# Patient Record
Sex: FEMALE | Race: WHITE | NOT HISPANIC OR LATINO | Employment: FULL TIME
[De-identification: names, ages, dates, MRNs, and addresses within clinical notes are randomized per-mention and may not be internally consistent; named-entity substitution may affect disease eponyms.]

---

## 2017-07-01 ENCOUNTER — HEALTH MAINTENANCE LETTER (OUTPATIENT)
Age: 47
End: 2017-07-01

## 2018-03-07 LAB — MAMMOGRAM: NORMAL

## 2018-07-20 ENCOUNTER — RECORDS - HEALTHEAST (OUTPATIENT)
Dept: LAB | Facility: CLINIC | Age: 48
End: 2018-07-20

## 2018-07-20 LAB
CHOLEST SERPL-MCNC: 209 MG/DL
FASTING STATUS PATIENT QL REPORTED: ABNORMAL
HDLC SERPL-MCNC: 43 MG/DL
LDLC SERPL CALC-MCNC: 132 MG/DL
TRIGL SERPL-MCNC: 168 MG/DL

## 2018-07-23 LAB
TTG IGA SER-ACNC: 0.1 U/ML
TTG IGG SER-ACNC: <0.6 U/ML

## 2019-08-20 ENCOUNTER — TELEPHONE (OUTPATIENT)
Dept: OTOLARYNGOLOGY | Facility: CLINIC | Age: 49
End: 2019-08-20

## 2019-08-20 NOTE — TELEPHONE ENCOUNTER
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 Yes    By : OMI Gabriel last    Date: 1/6/2011    Are there any new or worsening symptoms? Yes: the papilloma larynxhas come back and she is hoping to see Dr. Gabriel sooner than 11/12(There was an appointment on 8/27 but she will be moving he son into the [employer] dorms that day) anything other than that day she could do, please advise       Action Taken: Message routed to:  [facility] ([facility] ENT

## 2019-08-20 NOTE — TELEPHONE ENCOUNTER
LVM with patient letting her know that I was able to get her rescheduled to a sooner date of 9/24.  Left call center number in case that ends up not working for her.

## 2019-08-26 NOTE — TELEPHONE ENCOUNTER
FUTURE VISIT INFORMATION      FUTURE VISIT INFORMATION:    Date: 9.24.19    Time: 11:45 AM    Location: [facility]  REFERRAL INFORMATION:    Referring provider:  Self-referred *Pt previously saw Dr. Gabriel back in 2011    Referring providers clinic:  N/A    Reason for visit/diagnosis  Papilloma Larynx    RECORDS REQUESTED FROM:       Clinic name Comments Records Status Imaging Status   [facility] ENT 1.6.11 (most recent) Dr. Gabriel, office visit [facility] 12.17.10  9.16.05 [facility]    Radiology 12.17.10 (most recent) CT Sinus [facility] PACS                       Action    Action Taken Called pt and LVM on cell phone regarding any external medical records related to diagnoses.     Action 9.11.19 11:40 AM   Action Taken 2nd time leaving  regarding location of medical records. Left CB#.   -9.11.19 1:59 PM Spoke with PtRe, she has only been seen here. No outside records related to this issue.

## 2019-09-24 ENCOUNTER — TELEPHONE (OUTPATIENT)
Dept: OTOLARYNGOLOGY | Facility: CLINIC | Age: 49
End: 2019-09-24

## 2019-09-24 ENCOUNTER — OFFICE VISIT (OUTPATIENT)
Dept: OTOLARYNGOLOGY | Facility: CLINIC | Age: 49
End: 2019-09-24
Payer: COMMERCIAL

## 2019-09-24 ENCOUNTER — PRE VISIT (OUTPATIENT)
Dept: OTOLARYNGOLOGY | Facility: CLINIC | Age: 49
End: 2019-09-24

## 2019-09-24 VITALS
WEIGHT: 205 LBS | DIASTOLIC BLOOD PRESSURE: 91 MMHG | HEART RATE: 80 BPM | SYSTOLIC BLOOD PRESSURE: 134 MMHG | BODY MASS INDEX: 35.19 KG/M2

## 2019-09-24 DIAGNOSIS — D14.0 SCHNEIDERIAN PAPILLOMA: ICD-10-CM

## 2019-09-24 DIAGNOSIS — D36.9 PAPILLOMA: Primary | ICD-10-CM

## 2019-09-24 ASSESSMENT — PAIN SCALES - GENERAL: PAINLEVEL: NO PAIN (0)

## 2019-09-24 NOTE — NURSING NOTE
Chief Complaint   Patient presents with     Consult     Papilloma larynx recheck     BP (!) 134/91 (BP Location: Left arm, Patient Position: Chair, Cuff Size: Adult Regular)   Pulse 80   Wt 93 kg (205 lb)   BMI 35.19 kg/m        Aria Turner, EMT

## 2019-09-24 NOTE — LETTER
9/24/2019       RE: Chio Bledsoe  [address] [facility] [address] [address]     Dear Colleague,    Thank you for referring your patient, Chio Bledsoe, to the  rumr: turn off the lights [facility]. Please see a copy of my visit note below.    Erroneous encounter.  No skilled speech services were warranted.    Again, thank you for allowing me to participate in the care of your patient.      Sincerely,    Elisabet Jc, SLP

## 2019-09-24 NOTE — PATIENT INSTRUCTIONS
Thank you for choosing  Phys[facility].  Dr. Gabriel recommended you to follow up with Dr. Chapin.  Will contact you pertaining to biopsy results.     [phone] appointment scheduling option 1 and nurse advice option 3.

## 2019-09-24 NOTE — LETTER
9/24/2019       RE: Chio Bledsoe  [address] [address]     Dear Colleague,    Thank you for referring your patient, Chio Bledsoe, to the [facility] EAR NOSE AND THROAT at [facility]. Please see a copy of my visit note below.      HISTORY OF PRESENT ILLNESS: Chio Bledsoe is a 48 year old female with a history of right maxillary schneiderian papilloma.She had an endoscopic ethmoidectomy and maxillary antrostomy with   removal of a right anterior choanal polyp.  This turned out to be a   schneiderian papilloma.  This was performed on October 1, 2003. On 9/16/2005 she had a right endoscopic medial maxillectomy and a left maxillary antrostomy.  Papilloma filled the right maxillary sinus ostium and appeared to originate from the anterior wall of the maxillary sinus.  The entire inferior turbinate and medial wall the maxillary sinus were removed.  The middle turbinate was preserved.  She had an endoscopic maxillary antrostomy with removal of contents with image guidance for inverting papilloma on December 17, 2010.   She did quite well following the procedure.  There was a moderate amount of bleeding during the surgery but no postoperative bleeding.  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  I last saw her on January 6, 2011.  At that time there was some crusting in the nasal cavity but the ostium was widely patent.  Our plan was to have her follow-up every 6months.  She comes now approximately 8-1/2 years later.    She continues to be able to breathe through her nose but she has noticed increased snoring.  She notes a slight weight gain over the last 8 to 9 years but otherwise feels well.  She is here to check for her schneiderian papilloma to see if this could be related to her snoring.         Medical History      Medical History Date Comments   Chickenpox        Inverted papilloma of maxillary sinus   papilloma of right maxillary sinus   Anxiety           PAST SURGICAL HISTORY:   Past Surgical History:   Procedure Laterality Date     SURGICAL HISTORY OF -       sinus surgery X 3     SURGICAL HISTORY OF -       wisdom teeth extraction     SURGICAL HISTORY OF -   1/10    breaset reduction     SURGICAL HISTORY OF -   12-18-10    Right endoscopic maxillary antrostomy with removal of contents with image guidance       FAMILY HISTORY:   Family History   Problem Relation Age of Onset     C.A.D. Mother      Hypertension Mother      Lipids Mother      Diabetes No family hx of      Cerebrovascular Disease No family hx of      Breast Cancer No family hx of      Cancer - colorectal No family hx of        SOCIAL HISTORY:   Social History     Tobacco Use     Smoking status: Never Smoker     Smokeless tobacco: Never Used   Substance Use Topics     Alcohol use: Yes     Comment: occ       REVIEW OF SYSTEMS: Ten point review of systems was performed and is negative except for:   [facility] 9/20/2019   Constitutional Problems with sleep   Gastrointestinal/Genitourinary Heartburn/indigestion        ALLERGIES: Patient has no known allergies.    MEDICATIONS:   Current Outpatient Medications   Medication Sig Dispense Refill     SEASONIQUE 0.15-0.03 &0.01 MG OR TABS 1 TABLET DAILY           PHYSICAL EXAMINATION:  She  is awake, alert and in no apparent distress.    Her tympanic membranes are clear and intact bilaterally. External auditory canals are clear.  Nasal exam shows a mild septal deviation without obstruction.  Examination of the oral cavity shows no suspicious lesions.  There is symmetric movement of the tongue and soft palate.    The oropharynx is clear.  Her neck is supple without significant adenopathy.  Pulse is regular.  Upper airway is clear.  Cranial nerves II-XII are grossly intact.       PROCEDURE: A nasal endoscopy was performed.  Informed consent was obtained and a time out  was performed. 3% lidocaine and 0.25% phenylephrine was sprayed into the nasal cavity and allowed 3 to 5 minutes for effect. The scope was passed through the right sided nostril.  Examination showed papillomatous material in the floor the nasal cavity emanating from the lower half of the right maxillary sinus ostium.  This extended down the medial wall of the maxillary sinus.   The lateral and posterior wall of the maxillary sinus ostia is clear.  The papillomatous material extended slightly posteriorly but not into the nasopharynx.  The remainder the nasal cavity was clear.    After a thorough discussion we did decide to perform a biopsy of this material although it appeared very suspicious for schneiderian papilloma.  After obtaining informed consent additional 3% lidocaine and quarter percent phenylephrine sprayed in the right nasal cavity.  Under endoscopic visualization the starting papilloma was biopsied several times.  This was sent for permanent pathology.        IMPRESSION/PLAN: Recurrent  inverting papilloma.  My recommendation to her is that she sees my partner Dr. Chapin for evaluation and subsequent treatment of this lesion.  I advised her that he specializes in this disorder and would be able to give her longer term follow-up than I could.      Again, thank you for allowing me to participate in the care of your patient.      Sincerely,    Danny Gabriel MD

## 2019-09-24 NOTE — PROGRESS NOTES
HISTORY OF PRESENT ILLNESS: Chio Bledsoe is a 48 year old female with a history of right maxillary schneiderian papilloma.She had an endoscopic ethmoidectomy and maxillary antrostomy with   removal of a right anterior choanal polyp.  This turned out to be a   schneiderian papilloma.  This was performed on October 1, 2003. On 9/16/2005 she had a right endoscopic medial maxillectomy and a left maxillary antrostomy.  Papilloma filled the right maxillary sinus ostium and appeared to originate from the anterior wall of the maxillary sinus.  The entire inferior turbinate and medial wall the maxillary sinus were removed.  The middle turbinate was preserved.  She had an endoscopic maxillary antrostomy with removal of contents with image guidance for inverting papilloma on December 17, 2010.   She did quite well following the procedure.  There was a moderate amount of bleeding during the surgery but no postoperative bleeding.  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  I last saw her on January 6, 2011.  At that time there was some crusting in the nasal cavity but the ostium was widely patent.  Our plan was to have her follow-up every 6months.  She comes now approximately 8-1/2 years later.    She continues to be able to breathe through her nose but she has noticed increased snoring.  She notes a slight weight gain over the last 8 to 9 years but otherwise feels well.  She is here to check for her schneiderian papilloma to see if this could be related to her snoring.         Medical History      Medical History Date Comments   Chickenpox       Inverted papilloma of maxillary sinus   papilloma of right maxillary sinus   Anxiety           PAST SURGICAL HISTORY:   Past Surgical History:   Procedure Laterality Date     SURGICAL HISTORY OF -       sinus surgery X 3     SURGICAL HISTORY OF -       wisdom teeth extraction     SURGICAL HISTORY  OF -   1/10    breaset reduction     SURGICAL HISTORY OF -   12-18-10    Right endoscopic maxillary antrostomy with removal of contents with image guidance       FAMILY HISTORY:   Family History   Problem Relation Age of Onset     C.A.D. Mother      Hypertension Mother      Lipids Mother      Diabetes No family hx of      Cerebrovascular Disease No family hx of      Breast Cancer No family hx of      Cancer - colorectal No family hx of        SOCIAL HISTORY:   Social History     Tobacco Use     Smoking status: Never Smoker     Smokeless tobacco: Never Used   Substance Use Topics     Alcohol use: Yes     Comment: occ       REVIEW OF SYSTEMS: Ten point review of systems was performed and is negative except for:   [facility] ROS 9/20/2019   Constitutional Problems with sleep   Gastrointestinal/Genitourinary Heartburn/indigestion        ALLERGIES: Patient has no known allergies.    MEDICATIONS:   Current Outpatient Medications   Medication Sig Dispense Refill     SEASONIQUE 0.15-0.03 &0.01 MG OR TABS 1 TABLET DAILY           PHYSICAL EXAMINATION:  She  is awake, alert and in no apparent distress.    Her tympanic membranes are clear and intact bilaterally. External auditory canals are clear.  Nasal exam shows a mild septal deviation without obstruction.  Examination of the oral cavity shows no suspicious lesions.  There is symmetric movement of the tongue and soft palate.    The oropharynx is clear.  Her neck is supple without significant adenopathy.  Pulse is regular.  Upper airway is clear.  Cranial nerves II-XII are grossly intact.       PROCEDURE: A nasal endoscopy was performed.  Informed consent was obtained and a time out was performed. 3% lidocaine and 0.25% phenylephrine was sprayed into the nasal cavity and allowed 3 to 5 minutes for effect. The scope was passed through the right sided nostril.  Examination showed papillomatous material in the floor the nasal cavity emanating from the lower half of the right  maxillary sinus ostium.  This extended down the medial wall of the maxillary sinus.   The lateral and posterior wall of the maxillary sinus ostia is clear.  The papillomatous material extended slightly posteriorly but not into the nasopharynx.  The remainder the nasal cavity was clear.    After a thorough discussion we did decide to perform a biopsy of this material although it appeared very suspicious for schneiderian papilloma.  After obtaining informed consent additional 3% lidocaine and quarter percent phenylephrine sprayed in the right nasal cavity.  Under endoscopic visualization the starting papilloma was biopsied several times.  This was sent for permanent pathology.        IMPRESSION/PLAN: Recurrent  inverting papilloma.  My recommendation to her is that she sees my partner Dr. Chapin for evaluation and subsequent treatment of this lesion.  I advised her that he specializes in this disorder and would be able to give her longer term follow-up than I could.

## 2019-09-25 PROBLEM — D14.0: Status: ACTIVE | Noted: 2019-09-25

## 2019-09-26 LAB — COPATH REPORT: NORMAL

## 2019-10-03 ENCOUNTER — HEALTH MAINTENANCE LETTER (OUTPATIENT)
Age: 49
End: 2019-10-03

## 2019-10-23 ENCOUNTER — DOCUMENTATION ONLY (OUTPATIENT)
Dept: CARE COORDINATION | Facility: CLINIC | Age: 49
End: 2019-10-23

## 2019-11-01 ENCOUNTER — HEALTH MAINTENANCE LETTER (OUTPATIENT)
Age: 49
End: 2019-11-01

## 2019-12-03 DIAGNOSIS — D36.9 PAPILLOMA: Primary | ICD-10-CM

## 2019-12-04 ENCOUNTER — ANCILLARY PROCEDURE (OUTPATIENT)
Dept: CT IMAGING | Facility: CLINIC | Age: 49
End: 2019-12-04
Attending: OTOLARYNGOLOGY
Payer: COMMERCIAL

## 2019-12-04 ENCOUNTER — OFFICE VISIT (OUTPATIENT)
Dept: OTOLARYNGOLOGY | Facility: CLINIC | Age: 49
End: 2019-12-04
Payer: COMMERCIAL

## 2019-12-04 VITALS — WEIGHT: 205 LBS | HEIGHT: 65 IN | BODY MASS INDEX: 34.16 KG/M2

## 2019-12-04 DIAGNOSIS — D14.0 INVERTED PAPILLOMA OF LATERAL NASAL WALL: ICD-10-CM

## 2019-12-04 DIAGNOSIS — D36.9 PAPILLOMA: ICD-10-CM

## 2019-12-04 DIAGNOSIS — J32.0 CHRONIC MAXILLARY SINUSITIS: ICD-10-CM

## 2019-12-04 DIAGNOSIS — G47.30 SLEEP-DISORDERED BREATHING: ICD-10-CM

## 2019-12-04 DIAGNOSIS — R09.81 NASAL CONGESTION: ICD-10-CM

## 2019-12-04 DIAGNOSIS — R06.83 SNORING: Primary | ICD-10-CM

## 2019-12-04 DIAGNOSIS — D14.0 SCHNEIDERIAN PAPILLOMA: ICD-10-CM

## 2019-12-04 ASSESSMENT — PAIN SCALES - GENERAL: PAINLEVEL: NO PAIN (0)

## 2019-12-04 ASSESSMENT — MIFFLIN-ST. JEOR: SCORE: 1550.75

## 2019-12-04 NOTE — PROGRESS NOTES
[facility]                   New Patient Visit      Encounter date: December 4, 2019    Referring Provider:   Dr. Danny Gabriel MD    Chief Complaint: inverted papilloma; snoring    History of Present Illness: Chio Bledsoe is a 49 year old woman who is referred to me by Dr. Gabriel for inverted papilloma. She has undergone multiple ESS for recurrent IP of the right maxillary by Dr. aGbriel, most recently she underwent ESS with medial maxillectomy in 2010. She was lost to follow up for several years and returned to Dr. Gabriel's office with complaints of poor sleep, snoring, and she was curious about recurrence of IP. She was seen by Dr. Gabriel, who identified a lesion of the right nasal cavity and performed biopsy which indicated recurrence of IP ( no malignancy identified on biopsy). She denies epistaxis or significant nasal congestion, facial pain or pressure, facial hypesthesia. As noted, has noted poor sleep associated with snoring at night (unclear if apneic events present). She also states she feels that poor sleep quality may be perimenopausal.       Review of systems: A 14-point review of systems has been conducted and was negative for any notable symptoms, except as dictated in the history of present illness.     Past Medical History:   Diagnosis Date     Polyp of nasal cavity         Past Surgical History:   Procedure Laterality Date     SURGICAL HISTORY OF -       sinus surgery X 3     SURGICAL HISTORY OF -       wisdom teeth extraction     SURGICAL HISTORY OF -   1/10    breaset reduction     SURGICAL HISTORY OF -   12-18-10    Right endoscopic maxillary antrostomy with removal of contents with image guidance        Family History   Problem Relation Age of Onset     C.A.D. Mother      Hypertension Mother      Lipids Mother      Diabetes No family hx of      Cerebrovascular Disease No family hx of      Breast Cancer No family hx of      Cancer - colorectal No family hx  "of         Social History     Socioeconomic History     Marital status:      Spouse name: None     Number of children: None     Years of education: None     Highest education level: None   Occupational History     None   Social Needs     Financial resource strain: None     Food insecurity:     Worry: None     Inability: None     Transportation needs:     Medical: None     Non-medical: None   Tobacco Use     Smoking status: Never Smoker     Smokeless tobacco: Never Used   Substance and Sexual Activity     Alcohol use: Yes     Comment: occ     Drug use: No     Sexual activity: None   Lifestyle     Physical activity:     Days per week: None     Minutes per session: None     Stress: None   Relationships     Social connections:     Talks on phone: None     Gets together: None     Attends [employer] service: None     Active member of club or organization: None     Attends meetings of clubs or organizations: None     Relationship status: None     Intimate partner violence:     Fear of current or ex partner: None     Emotionally abused: None     Physically abused: None     Forced sexual activity: None   Other Topics Concern     None   Social History Narrative     None        Physical Exam:  Vital signs: Ht 1.651 m (5' 5\")   Wt 93 kg (205 lb)   BMI 34.11 kg/m     General Appearance: No acute distress, appropriate demeanor, conversant  Eyes: moist conjunctivae; EOMI; pupils symmetric; visual acuity grossly intact; no proptosis  Head: normocephalic; overall symmetric appearance without deformity  Face: overall symmetric without deformity; HB I-VI  Ears: Normal appearance of external ear; external meatus normal in appearance; TMs intact without perforation bilaterally;   Nose: No external deformity; septum relatively midline; shaggy polypoid mass evident in right nasal cavity   Oral Cavity/oropharynx: Normal appearance of mucosa; tongue midline; no mass or lesions; tonsils 1+; oropharynx without obvious mucosal " abnormality; Mallampati class 3 oral/oropharyngeal airway  Neck: no palpable lymphadenopathy; thyroid without palpable nodules  Lungs: symmetric chest rise; no wheezing  CV: Good distal perfusion; normal hear rate  Extremities: No deformity  Neurologic Exam: Cranial nerves II-XII are grossly intact; no focal deficit      Procedure Note  Procedure performed: Rigid nasal endoscopy  Indication: To evaluate for sinonasal pathology not visualized on routine anterior rhinoscopy  Anesthesia: 4% topical lidocaine with 0.05% oxymetazoline  Description of procedure: A 30 degree, 3 mm rigid endoscope was inserted into bilateral nasal cavities and the nasal valves, nasal cavity, middle meatus, sphenoethmoid recess, and nasopharynx were thoroughly evaluated for evidence of obstruction, edema, purulence, polyps and/or mass/lesion.     Findings  RT: shaggy polypoid lesion of right nasal cavity, lateral nasal wall; difficult to determine of max sinus is auto-obliterated with mucosal thickening or tumor present in max sinus  LT: nasal cavity clear; MM clear    The patient tolerated the procedure well without complication.     Laboratory Review:  n/a    Imaging Review:  Reviewed updated CT sinus obtained prior to visit on 12/4/2019: RT max sinus/nasal cavity mass c/w known inverted papilloma; there is yareli-osteogenesis of RT max sinus    Pathology Review:  9/24/2019  SPECIMEN(S):   Papiloma     FINAL DIAGNOSIS:   SINUS MASS, SITE NOT SPECIFIED, EXCISIONAL BIOPSY:   - Sinonasal (Schneiderian) papilloma, inverted type, inflamed.   - No evidence of dysplasia or malignancy identified.     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 Mrs. Bledsoe's primary motivation for seeking treatment today is related to belief that recurrent IP may be contributing to poor sleep quality/snoring. I related to the patient that her poor sleep quality and snoring  is multifactorial and may be perimenopausal. Since she has had multiple surgeries for IP, she is reluctant to proceed with surgery immediately, but is curious as to whether other airway surgery may be done concordantly to address snoring while also addressing her recurrent IP. I discussed with Chio that prior to any sleep surgery being performed, a sleep study would need to be performed. I also discussed that while the right nasal cavity is obstructed by recurrent IP, I don't think that this, by itself, is soley responsible for her poor sleep quality and snoring. We discussed merits of nasal surgery and the potential effect on snoring.  I also discussed risks of revision ESS for IP removal, including but not limited to, orbital injury, facial hypesthesia, dental/tooth root injury, need for Carrero-Jean-Paul approach to reach lateral limits of tumor (lateral to infraorbital nerve), epistaxis, recurrence, change in final pathology, among others. I also discussed risks of sampling error on most recent biopsy and that there is still chance that tumor may harbor dysplastic portions/malignancy. She expressed understanding.       Plan:  1. Will order formal sleep study  2. Will have patient follow up with me after sleep study, potentially on same day as clinic with Dr. White. Can potentially discuss merits of sleep surgery. I would not recommend tonsillectomy be performed concurrently with IP surgery.     Naeem Chapin MD    [facility]  Rhinology  Endoscopic Skull Base Surgery  [facility]  Department of Otolaryngology - Head & Neck Surgery

## 2019-12-04 NOTE — LETTER
12/4/2019        RE: Chio Bledsoe  [address] [facility] [id number]     Dear Colleague,    Thank you for referring your patient, Chio Bledsoe, to the [facility] EAR NOSE AND THROAT at [facility]. Please see a copy of my visit note below.                       [facility]                   New Patient Visit      Encounter date: December 4, 2019    Referring Provider:   Dr. Danny Gabriel MD    Chief Complaint: inverted papilloma; snoring    History of Present Illness: Chio Bledsoe is a 49 year old woman who is referred to me by Dr. Gabriel for inverted papilloma. She has undergone multiple ESS for recurrent IP of the right maxillary by Dr. Gabriel, most recently she underwent ESS with medial maxillectomy in 2010. She was lost to follow up for several years and returned to Dr. Gabriel's office with complaints of poor sleep, snoring, and she was curious about recurrence of IP. She was seen by Dr. Gabriel, who identified a lesion of the right nasal cavity and performed biopsy which indicated recurrence of IP ( no malignancy identified on biopsy). She denies epistaxis or significant nasal congestion, facial pain or pressure, facial hypesthesia. As noted, has noted poor sleep associated with snoring at night (unclear if apneic events present). She also states she feels that poor sleep quality may be perimenopausal.       Review of systems: A 14-point review of systems has been conducted and was negative for any notable symptoms, except as dictated in the history of present illness.     Past Medical History:   Diagnosis Date     Polyp of nasal cavity         Past Surgical History:   Procedure Laterality Date     SURGICAL HISTORY OF -       sinus surgery X 3     SURGICAL HISTORY OF -       wisdom teeth extraction     SURGICAL HISTORY OF -   1/10    breaset reduction     SURGICAL HISTORY OF -   12-18-10    Right endoscopic maxillary antrostomy with  "removal of contents with image guidance        Family History   Problem Relation Age of Onset     C.A.D. Mother      Hypertension Mother      Lipids Mother      Diabetes No family hx of      Cerebrovascular Disease No family hx of      Breast Cancer No family hx of      Cancer - colorectal No family hx of         Social History     Socioeconomic History     Marital status:      Spouse name: None     Number of children: None     Years of education: None     Highest education level: None   Occupational History     None   Social Needs     Financial resource strain: None     Food insecurity:     Worry: None     Inability: None     Transportation needs:     Medical: None     Non-medical: None   Tobacco Use     Smoking status: Never Smoker     Smokeless tobacco: Never Used   Substance and Sexual Activity     Alcohol use: Yes     Comment: occ     Drug use: No     Sexual activity: None   Lifestyle     Physical activity:     Days per week: None     Minutes per session: None     Stress: None   Relationships     Social connections:     Talks on phone: None     Gets together: None     Attends Uats[employer] service: None     Active member of club or organization: None     Attends meetings of clubs or organizations: None     Relationship status: None     Intimate partner violence:     Fear of current or ex partner: None     Emotionally abused: None     Physically abused: None     Forced sexual activity: None   Other Topics Concern     None   Social History Narrative     None        Physical Exam:  Vital signs: Ht 1.651 m (5' 5\")   Wt 93 kg (205 lb)   BMI 34.11 kg/m      General Appearance: No acute distress, appropriate demeanor, conversant  Eyes: moist conjunctivae; EOMI; pupils symmetric; visual acuity grossly intact; no proptosis  Head: normocephalic; overall symmetric appearance without deformity  Face: overall symmetric without deformity; HB I-VI  Ears: Normal appearance of external ear; external meatus normal in " appearance; TMs intact without perforation bilaterally;   Nose: No external deformity; septum relatively midline; shaggy polypoid mass evident in right nasal cavity   Oral Cavity/oropharynx: Normal appearance of mucosa; tongue midline; no mass or lesions; tonsils 1+; oropharynx without obvious mucosal abnormality; Mallampati class 3 oral/oropharyngeal airway  Neck: no palpable lymphadenopathy; thyroid without palpable nodules  Lungs: symmetric chest rise; no wheezing  CV: Good distal perfusion; normal hear rate  Extremities: No deformity  Neurologic Exam: Cranial nerves II-XII are grossly intact; no focal deficit      Procedure Note  Procedure performed: Rigid nasal endoscopy  Indication: To evaluate for sinonasal pathology not visualized on routine anterior rhinoscopy  Anesthesia: 4% topical lidocaine with 0.05% oxymetazoline  Description of procedure: A 30 degree, 3 mm rigid endoscope was inserted into bilateral nasal cavities and the nasal valves, nasal cavity, middle meatus, sphenoethmoid recess, and nasopharynx were thoroughly evaluated for evidence of obstruction, edema, purulence, polyps and/or mass/lesion.     Findings  RT: shaggy polypoid lesion of right nasal cavity, lateral nasal wall; difficult to determine of max sinus is auto-obliterated with mucosal thickening or tumor present in max sinus  LT: nasal cavity clear; MM clear    The patient tolerated the procedure well without complication.     Laboratory Review:  n/a    Imaging Review:  Reviewed updated CT sinus obtained prior to visit on 12/4/2019: RT max sinus/nasal cavity mass c/w known inverted papilloma; there is yareli-osteogenesis of RT max sinus    Pathology Review:  9/24/2019  SPECIMEN(S):   Papiloma     FINAL DIAGNOSIS:   SINUS MASS, SITE NOT SPECIFIED, EXCISIONAL BIOPSY:   - Sinonasal (Schneiderian) papilloma, inverted type, inflamed.   - No evidence of dysplasia or malignancy identified.     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 Mrs. Bledsoe's primary motivation for seeking treatment today is related to belief that recurrent IP may be contributing to poor sleep quality/snoring. I related to the patient that her poor sleep quality and snoring is multifactorial and may be perimenopausal. Since she has had multiple surgeries for IP, she is reluctant to proceed with surgery immediately, but is curious as to whether other airway surgery may be done concordantly to address snoring while also addressing her recurrent IP. I discussed with Chio that prior to any sleep surgery being performed, a sleep study would need to be performed. I also discussed that while the right nasal cavity is obstructed by recurrent IP, I don't think that this, by itself, is soley responsible for her poor sleep quality and snoring. We discussed merits of nasal surgery and the potential effect on snoring.  I also discussed risks of revision ESS for IP removal, including but not limited to, orbital injury, facial hypesthesia, dental/tooth root injury, need for Carrero-Jean-Paul approach to reach lateral limits of tumor (lateral to infraorbital nerve), epistaxis, recurrence, change in final pathology, among others. I also discussed risks of sampling error on most recent biopsy and that there is still chance that tumor may harbor dysplastic portions/malignancy. She expressed understanding.       Plan:  1. Will order formal sleep study  2. Will have patient follow up with me after sleep study, potentially on same day as clinic with Dr. White. Can potentially discuss merits of sleep surgery. I would not recommend tonsillectomy be performed concurrently with IP surgery.     Naeem Chapin MD    [facility]  Rhinology  Endoscopic Skull Base Surgery  [facility]  Department of Otolaryngology - Head & Neck Surgery

## 2019-12-04 NOTE — NURSING NOTE
"Chief Complaint   Patient presents with     RECHECK     Papilloma, snoring     Height 1.651 m (5' 5\"), weight 93 kg (205 lb), not currently breastfeeding.    Aria Turner, EMT  "

## 2019-12-04 NOTE — PATIENT INSTRUCTIONS
You were seen in the ENT clinic today with Dr. Chapin    Recommendations for you:    -Sleep Study      We would like you to follow up one week after your sleep study to see Dr. White and Dr. Chapin.       Please call our clinic for any questions, concerns, and/or worsening symptoms.      Clinic #[phone]       Option 1 for scheduling.    Thank you for allowing us to be apart of your care!    Tamika CORONEL RNCC    If you need to reach me my direct line is: [phone]

## 2020-06-23 ENCOUNTER — TRANSFERRED RECORDS (OUTPATIENT)
Dept: MULTI SPECIALTY CLINIC | Facility: CLINIC | Age: 50
End: 2020-06-23
Payer: COMMERCIAL

## 2020-06-23 LAB
HPV ABSTRACT: NORMAL
PAP-ABSTRACT: NORMAL

## 2020-10-22 VITALS — HEIGHT: 65 IN | WEIGHT: 195 LBS | BODY MASS INDEX: 32.49 KG/M2

## 2020-10-22 RX ORDER — LEVONORGESTREL / ETHINYL ESTRADIOL AND ETHINYL ESTRADIOL 150-30(84)
1 KIT ORAL
COMMUNITY
Start: 2020-03-17 | End: 2021-12-01

## 2020-10-22 RX ORDER — TRAZODONE HYDROCHLORIDE 50 MG/1
TABLET, FILM COATED ORAL
COMMUNITY
End: 2021-09-02

## 2020-10-22 ASSESSMENT — MIFFLIN-ST. JEOR: SCORE: 1500.39

## 2020-10-22 NOTE — PROGRESS NOTES
Sleep Consultation:    Date on this visit: 10/23/2020    Chio Bledsoe is sent by Naeem Chapin MD for a sleep consultation regarding snoring.    Primary Physician: Chio Rodriguez     Chio Bledsoe reports snoring and poor sleep quality for 4 years. 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    She has also been referred to Chio White to discuss surgical options for possible apnea.    Re goes to sleep at 11:30 PM to midnight during the week. She wakes up at 8:00 AM (sometimes 7 AM if she has to bring one of her kids to school) with an alarm. She feels her deepest sleep is between 6-7 AM. She falls asleep in 60 minutes.  Chio has difficulty falling asleep and staying asleep. She takes trazodone 12.5-25 mg to help with waking in the night about weekly. She wakes up 1-2 times a night for  minutes before falling back to sleep. She feels the trazodone helps her sleep better for a few days. Chio wakes up feeling hot or to uncertain reasons. She may get up for the restroom, but does not think that is why she wakes.  If she is tossing and turning a lot, she may go to the couch or another bed.  On weekends, her sleep schedule is about 30 minutes later.  Patient gets an average of 4-6 hours of sleep per night without trazodone, 7 hours with trazodone. Her FitBit says she wakes 12-15 times per night.    Patient does worry in bed and does not use electronics in bed, watch TV in bed and read in bed.     Chio does not work currently. She does not have much to do in the day other than visiting her mother who is in a memory care facility. She lives with her  and 2 kids (15 and 20-at college).      Chio does snore some nights and snoring is loud. She is only aware of snoring over the last 3-4 years. Patient does have a regular bed partner. They occasionally sleep separately but not due to her  snoring. Her  only occasionally comments on her snoring. He is a heavy sleeper, so she thinks it occurs more often than he comments. Whenever her family travels together, her kids comment on her snoring. She wakes herself with a snort and jerk in the first 1-2 hours of the night. She is often supine when that happens. Her family has observed that when they travel together. There is no report of witnessed apneas.  Patient sleeps on her side and stomach. She has occasional morning headaches (at the back of her head), denies morning dry mouth, morning confusion and restless legs. Chio has occasional hypnogogic/hypnopompic hallucinations or confusional arousals (sometimes wakes and thinks there is someone in the closet, sometimes she wakes and she has gone into the closet, this has not happened lately) and denies any bruxism, sleep walking, sleep talking, sleep paralysis and cataplexy.    Chio has difficulty breathing through her nose, reflux at night, heartburn and anxiety, denies claustrophobia and depression.  She has difficulty breathing through her nose due to the papilloma.  She was on escitalopram for anxiety about 4 years ago. She was on it for 2 years. She has a wedge pillow for reflux at night. She tries to reduce food after 7 PM and has reduced specific triggering foods.    Chio has gained 10-15 pounds in the last 5-6 years.  Patient describes themself as a night person.  She would prefer to go to sleep at 1:00 AM and wake up at 9:00-10:00 AM.  Patient's [place] Sleepiness score 16/24 consistent with moderate daytime sleepiness.      Chio naps 1-2 times per week and feels unrefreshed after naps. She takes some inadvertant naps reading. If she took trazodone the night before, she sleeps 2 hours, otherwise, she sleeps 15 minutes.  She denies dozing while driving.  Patient was counseled on the importance of driving while alert, to pull over if drowsy, or nap before getting into the vehicle  if sleepy.  She uses 1-2 cups/day of coffee. Last caffeine intake is usually before noon.    Allergies:    Allergies   Allergen Reactions     [place] Nite Time Anxiety       Medications:    Current Outpatient Medications   Medication Sig Dispense Refill     levonorgest-eth estrad 91-Day (SEASONIQUE) 0.15-0.03 &0.01 MG tablet Take 1 tablet by mouth       SEASONIQUE 0.15-0.03 &0.01 MG OR TABS 1 TABLET DAILY       traZODone (DESYREL) 50 MG tablet 1/2 TAB(S) ONCE AT NIGHT ORALLY 90 DAYS         Problem List:  Patient Active Problem List    Diagnosis Date Noted     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     Priority: Medium     CARDIOVASCULAR SCREENING; LDL GOAL LESS THAN 160 10/31/2010     Priority: Medium     SINUS POLYP 09/13/2005     Priority: Medium        Past Medical/Surgical History:  Past Medical History:   Diagnosis Date     Polyp of nasal cavity      Past Surgical History:   Procedure Laterality Date     SURGICAL HISTORY OF -       sinus surgery X 3     SURGICAL HISTORY OF -       wisdom teeth extraction     SURGICAL HISTORY OF -   1/10    breaset reduction     SURGICAL HISTORY OF -   12-18-10    Right endoscopic maxillary antrostomy with removal of contents with image guidance       Social History:  Social History     Socioeconomic History     Marital status:      Spouse name: Not on file     Number of children: Not on file     Years of education: Not on file     Highest education level: Not on file   Occupational History     Not on file   Social Needs     Financial resource strain: Not on file     Food insecurity     Worry: Not on file     Inability: Not on file     Transportation needs     Medical: Not on file     Non-medical: Not on file   Tobacco Use     Smoking status: Never Smoker     Smokeless tobacco: Never Used   Substance and Sexual Activity     Alcohol use: Yes     Comment: occ     Drug use: No     Sexual activity: Not on file   Lifestyle     Physical activity     Days per week: Not on file      Minutes per session: Not on file     Stress: Not on file   Relationships     Social connections     Talks on phone: Not on file     Gets together: Not on file     Attends Restorationism service: Not on file     Active member of club or organization: Not on file     Attends meetings of clubs or organizations: Not on file     Relationship status: Not on file     Intimate partner violence     Fear of current or ex partner: Not on file     Emotionally abused: Not on file     Physically abused: Not on file     Forced sexual activity: Not on file   Other Topics Concern     Not on file   Social History Narrative     Not on file       Family History:  Family History   Problem Relation Age of Onset     C.A.D. Mother      Hypertension Mother      Lipids Mother      Diabetes No family hx of      Cerebrovascular Disease No family hx of      Breast Cancer No family hx of      Cancer - colorectal No family hx of        Review of Systems:  A complete review of systems reviewed by me is negative with the exeption of what has been mentioned in the history of present illness.  CONSTITUTIONAL: NEGATIVE for weight gain/loss, fever, chills, sweats or night sweats, drug allergies.  EYES: NEGATIVE for changes in vision, blind spots, double vision.  ENT: NEGATIVE for ear pain, sore throat, sinus pain, post-nasal drip, runny nose, bloody nose  CARDIAC: NEGATIVE for fast heartbeats or fluttering in chest, chest pain or pressure, breathlessness when lying flat, swollen legs or swollen feet.  NEUROLOGIC: NEGATIVE headaches, weakness or numbness in the arms or legs.  DERMATOLOGIC: NEGATIVE for rashes, new moles or change in mole(s)  PULMONARY: NEGATIVE SOB at rest, SOB with activity, dry cough, productive cough, coughing up blood, wheezing or whistling when breathing.    GASTROINTESTINAL: NEGATIVE for nausea or vomitting, loose or watery stools, fat or grease in stools, constipation, abdominal pain, bowel movements black in color or blood  "noted.  GENITOURINARY: NEGATIVE for pain during urination, blood in urine, urinating more frequently than usual, irregular menstrual periods.  MUSCULOSKELETAL: NEGATIVE for muscle pain, bone or joint pain, swollen joints.  ENDOCRINE: NEGATIVE for increased thirst or urination, diabetes.  LYMPHATIC: NEGATIVE for swollen lymph nodes, lumps or bumps in the breasts or nipple discharge.    Physical Examination:  Vitals: Ht 1.651 m (5' 5\")   Wt 88.5 kg (195 lb)   BMI 32.45 kg/m           [place] Total Score 10/22/2020   Total score - [place] 16       RHIANNON Total Score: 19 (10/22/20 1400)      Impression/Plan:    (R06.83) Snoring  (primary encounter diagnosis), (R40.0) Sleepiness  Comment: Re presents with concerns of snoring and poor sleep quality in the last 4 years. She is not sure how much of her symptoms are a consequence of a papilloma crowding her maxillary sinus, menopause, or weight gain. She does not know exactly how much she snores because her  is a sound sleeper. She has woken herself with a snort and body jerk, primarily if supine. This has been observed by her sons during travels. She is not told that she seems to have pauses in her breathing. She has daytime sleepiness, ESS 16/24. She has gained 10-15 pounds in the last 5-6 years. Her age is 50. Negative risk factors include; no HTN, BMI <35 (32), female gender. We do not have a neck circumference. She will return to her ENT provider and Chio White to discuss surgical options and to see if a procedure for apnea could be performed at the same time as the papilloma is addressed.  Plan: Comprehensive Sleep Study        Full night PSG. Bring trazodone to help with sleep if needed. We discussed that surgical options are generally a last resort and about 50% efficacy. I would defer to an opinion from Dr. White though.     (F51.04) Chronic insomnia  Comment: She is in bed 11:30 PM or midnight to 8 AM (sometimes 7 AM if she needs to bring her daughter to " school). It takes her an hour to fall asleep and she wakes 1-2 times per night for  minutes. She does worry in bed. She also has not been working and is a lot more sedentary around the house now. 12.5-25 mg trazodone has been very helpful, but she does not want to be dependent on it.   Plan: We will discuss management options after her sleep study.      Literature provided regarding sleep apnea.      She will follow up with me in approximately two weeks after her sleep study has been competed to review the results and discuss plan of care.       Polysomnography reviewed.  Obstructive sleep apnea reviewed.  Complications of untreated sleep apnea were reviewed.  69 minutes (9:04 AM-10:13 AM) was spent during this visit, over 50% in counseling and coordination of care.   Bennett Goltz, PA-C     CC: Naeem Chapin MD

## 2020-10-22 NOTE — PATIENT INSTRUCTIONS
"MY TREATMENT INFORMATION FOR SLEEP APNEA-  Chio Bledsoe    DOCTOR : Bennett Ezra Goltz, PA-C  SLEEP CENTER : [place]     MY CONTACT NUMBER: [phone]    Am I having a sleep study at a sleep center?  Make sure you have an appointment for the study before you leave!    Am I having a home sleep study?  Watch this video:  /drop off device-   Https://www.Nistica.com/watch?v=yGGFBdELGhk  Disposable device sent out require phone/computer application-   https://www.Nistica.com/watch?v=BCce_vbiwxE  Please verify your insurance coverage with your insurance carrier    Frequently asked questions:  1. What is Obstructive Sleep Apnea (FABIOLA)? FABIOLA is the most common type of sleep apnea. Apnea means, \"without breath.\"  Apnea is most often caused by narrowing or collapse of the upper airway as muscles relax during sleep.   Almost everyone has occasional apneas. Most people with sleep apnea have had brief interruptions at night frequently for many years.  The severity of sleep apnea is related to how frequent and severe the events are.   2. What are the consequences of FABIOLA? Symptoms include: feeling sleepy during the day, snoring loudly, gasping or stopping of breathing, trouble sleeping, and occasionally morning headaches or heartburn at night.  Sleepiness can be serious and even increase the risk of falling asleep while driving. Other health consequences may include development of high blood pressure and other cardiovascular disease in persons who are susceptible. Untreated FABIOLA  can contribute to heart disease, stroke and diabetes.   3. What are the treatment options? In most situations, sleep apnea is a lifelong disease that must be managed with daily therapy. Medications are not effective for sleep apnea and surgery is generally not considered until other therapies have been tried. Your treatment is your choice . Continuous Positive Airway (CPAP) works right away and is the therapy that is effective in nearly " everyone. An oral device to hold your jaw forward is usually the next most reliable option. Other options include postioning devices (to keep you off your back), weight loss, and surgery including a tongue pacing device. There is more detail about some of these options below.    Important tips for using CPAP and similar devices   Know your equipment:  CPAP is continuous positive airway pressure that prevents obstructive sleep apnea by keeping the throat from collapsing while you are sleeping. In most cases, the device is  smart  and can slowly self-adjusts if your throat collapses and keeps a record every day of how well you are treated-this information is available to you and your care team.  BPAP is bilevel positive airway pressure that keeps your throat open and also assists each breath with a pressure boost to maintain adequate breathing.  Special kinds of BPAP are used in patients who have inadequate breathing from lung or heart disease. In most cases, the device is  smart  and can slowly self-adjusts to assist breathing. Like CPAP, the device keeps a record of how well you are treated.  Your mask is your connection to the device. You get to choose what feels most comfortable and the staff will help to make sure if fits. Here: are some examples of the different masks that are available:       Key points to remember on your journey with sleep apnea:  1. Sleep study.  PAP devices often need to be adjusted during a sleep study to show that they are effective and adjusted right.  2. Good tips to remember: Try wearing just the mask during a quiet time during the day so your body adapts to wearing it. A humidifier is recommended for comfort in most cases to prevent drying of your nose and throat. Allergy medication from your provider may help you if you are having nasal congestion.  3. Getting settled-in. It takes more than one night for most of us to get used to wearing a mask. Try wearing just the mask during a  quiet time during the day so your body adapts to wearing it. A humidifier is recommended for comfort in most cases. Our team will work with you carefully on the first day and will be in contact within 4 days and again at 2 and 4 weeks for advice and remote device adjustments. Your therapy is evaluated by the device each day.   4. Use it every night. The more you are able to sleep naturally for 7-8 hours, the more likely you will have good sleep and to prevent health risks or symptoms from sleep apnea. Even if you use it 4 hours it helps. Occasionally all of us are unable to use a medical therapy, in sleep apnea, it is not dangerous to miss one night.   5. Communicate. Call our skilled team on the number provided on the first day if your visit for problems that make it difficult to wear the device. Over 2 out of 3 patients can learn to wear the device long-term with help from our team. Remember to call our team or your sleep providers if you are unable to wear the device as we may have other solutions for those who cannot adapt to mask CPAP therapy. It is recommended that you sleep your sleep provider within the first 3 months and yearly after that if you are not having problems.   6. Use it for your health. We encourage use of CPAP masks during daytime quiet periods to allow your face and brain to adapt to the sensation of CPAP so that it will be a more natural sensation to awaken to at night or during naps. This can be very useful during the first few weeks or months of adapting to CPAP though it does not help medically to wear CPAP during wakefulness and  should not be used as a strategy just to meet guidelines.  7. Take care of your equipment. Make sure you clean your mask and tubing using directions every day and that your filter and mask are replaced as recommended or if they are not working.     BESIDES CPAP, WHAT OTHER THERAPIES ARE THERE?    Positioning Device  Positioning devices are generally used when  sleep apnea is mild and only occurs on your back.This example shows a pillow that straps around the waist. It may be appropriate for those whose sleep study shows milder sleep apnea that occurs primarily when lying flat on one's back. Preliminary studies have shown benefit but effectiveness at home may need to be verified by a home sleep test. These devices are generally not covered by medical insurance.  Examples of devices that maintain sleeping on the back to prevent snoring and mild sleep apnea.    Belt type body positioner  Http://BIO-NEMS/    Electronic reminder  Http://nightshifttherapy.com/  Http://[email or web address].au/      Oral Appliance  What is oral appliance therapy?  An oral appliance device fits on your teeth at night like a retainer used after having braces. The device is made by a specialized dentist and requires several visits over 1-2 months before a manufactured device is made to fit your teeth and is adjusted to prevent your sleep apnea. Once an oral device is working properly, snoring should be improved. A home sleep test may be recommended at that time if to determine whether the sleep apnea is adequately treated.       Some things to remember:  -Oral devices are often, but not always, covered by your medical insurance. Be sure to check with your insurance provider.   -If you are referred for oral therapy, you will be given a list of specialized dentists to consider or you may choose to visit the Web site of the American Academy of Dental Sleep Medicine  -Oral devices are less likely to work if you have severe sleep apnea or are extremely overweight.     More detailed information  An oral appliance is a small acrylic device that fits over the upper and lower teeth  (similar to a retainer or a mouth guard). This device slightly moves jaw forward, which moves the base of the tongue forward, opens the airway, improves breathing for effective treat snoring and obstructive sleep apnea in perhaps  7 out of 10 people .  The best working devices are custom-made by a dental device  after a mold is made of the teeth 1, 2, 3.  When is an oral appliance indicated?  Oral appliance therapy is recommended as a first-line treatment for patients with primary snoring, mild sleep apnea, and for patients with moderate sleep apnea who prefer appliance therapy to use of CPAP4, 5. Severity of sleep apnea is determined by sleep testing and is based on the number of respiratory events per hour of sleep.   How successful is oral appliance therapy?  The success rate of oral appliance therapy in patients with mild sleep apnea is 75-80% while in patients with moderate sleep apnea it is 50-70%. The chance of success in patients with severe sleep apnea is 40-50%. The research also shows that oral appliances have a beneficial effect on the cardiovascular health of FABIOLA patients at the same magnitude as CPAP therapy7.  Oral appliances should be a second-line treatment in cases of severe sleep apnea, but if not completely successful then a combination therapy utilizing CPAP plus oral appliance therapy may be effective. Oral appliances tend to be effective in a broad range of patients although studies show that the patients who have the highest success are females, younger patients, those with milder disease, and less severe obesity. 3, 6.   Finding a dentist that practices dental sleep medicine  Specific training is available through the American Academy of Dental Sleep Medicine for dentists interested in working in the field of sleep. To find a dentist who is educated in the field of sleep and the use of oral appliances, near you, visit the Web site of the American Academy of Dental Sleep Medicine.    References  1. Christi et al. Objectively measured vs self-reported compliance during oral appliance therapy for sleep-disordered breathing. Chest 2013; 144(5): 4252-0727.  2. Kathe et al. Objective measurement of  compliance during oral appliance therapy for sleep-disordered breathing. Thorax 2013; 68(1): 91-96.  3. Tim, et al. Mandibular advancement devices in 620 men and women with FABIOLA and snoring: tolerability and predictors of treatment success. Chest 2004; 125: 2399-5912.  4. Lauro et al. Oral appliances for snoring and FABIOLA: a review. Sleep 2006; 29: 244-262.  5. Abram et al. Oral appliance treatment for FABIOLA: an update. J Clin Sleep Med 2014; 10(2): 215-227.  6. Nancy et al. Predictors of OSAH treatment outcome. J Dent Res 2007; 86: 0401-2001.      Weight Loss:    Weight loss is a long-term strategy that may improve sleep apnea in some patients.    Weight management is a personal decision and the decision should be based on your interest and the potential benefits.  If you are interested in exploring weight loss strategies, the following discussion covers the impact on weight loss on sleep apnea and the approaches that may be successful.    Being overweight does not necessarily mean you will have health consequences.  Those who have BMI over 35 or over 27 with existing medical conditions carries greater risk.   Weight loss decreases severity of sleep apnea in most people with obesity. For those with mild obesity who have developed snoring with weight gain, even 15-30 pound weight loss can improve and occasionally eliminate sleep apnea.  Structured and life-long dietary and health habits are necessary to lose weight and keep healthier weight levels.     Though there may be significant health benefits from weight loss, long-term weight loss is very difficult to achieve- studies show success with dietary management in less than 10% of people. In addition, substantial weight loss may require years of dietary control and may be difficult if patients have severe obesity. In these cases, surgical management may be considered.  Finally, older individuals who have tolerated obesity without health complications  may be less likely to benefit from weight loss strategies.      Your BMI is There is no height or weight on file to calculate BMI.  Weight management is a personal decision.  If you are interested in exploring weight loss strategies, the following discussion covers the approaches that may be successful. Body mass index (BMI) is one way to tell whether you are at a healthy weight, overweight, or obese. It measures your weight in relation to your height.  A BMI of 18.5 to 24.9 is in the healthy range. A person with a BMI of 25 to 29.9 is considered overweight, and someone with a BMI of 30 or greater is considered obese. More than two-thirds of American adults are considered overweight or obese.  Being overweight or obese increases the risk for further weight gain. Excess weight may lead to heart disease and diabetes.  Creating and following plans for healthy eating and physical activity may help you improve your health.  Weight control is part of healthy lifestyle and includes exercise, emotional health, and healthy eating habits. Careful eating habits lifelong are the mainstay of weight control. Though there are significant health benefits from weight loss, long-term weight loss with diet alone may be very difficult to achieve- studies show long-term success with dietary management in less than 10% of people. Attaining a healthy weight may be especially difficult to achieve in those with severe obesity. In some cases, medications, devices and surgical management might be considered.  What can you do?  If you are overweight or obese and are interested in methods for weight loss, you should discuss this with your provider.     Consider reducing daily calorie intake by 500 calories.     Keep a food journal.     Avoiding skipping meals, consider cutting portions instead.    Diet combined with exercise helps maintain muscle while optimizing fat loss. Strength training is particularly important for building and maintaining  muscle mass. Exercise helps reduce stress, increase energy, and improves fitness. Increasing exercise without diet control, however, may not burn enough calories to loose weight.       Start walking three days a week 10-20 minutes at a time    Work towards walking thirty minutes five days a week     Eventually, increase the speed of your walking for 1-2 minutes at time    In addition, we recommend that you review healthy lifestyles and methods for weight loss available through the National Institutes of Health patient information sites:  http://win.niddk.nih.gov/publications/index.htm    And look into health and wellness programs that may be available through your health insurance provider, employer, local community center, or joao club.        Surgery:  Surgery for obstructive sleep apnea is considered generally only when other therapies fail to work. Surgery may be discussed with you if you are having a difficult time tolerating CPAP and or when there is an abnormal structure that requires surgical correction.  Nose and throat surgeries often enlarge the airway to prevent collapse.  Most of these surgeries create pain for 1-2 weeks and up to half of the most common surgeries are not effective throughout life.  You should carefully discuss the benefits and drawbacks to surgery with your sleep provider and surgeon to determine if it is the best solution for you.   More information  Surgery for FABIOLA is directed at areas that are responsible for narrowing or complete obstruction of the airway during sleep.  There are a wide range of procedures available to enlarge and/or stabilize the airway to prevent blockage of breathing in the three major areas where it can occur: the palate, tongue, and nasal regions.  Successful surgical treatment depends on the accurate identification of the factors responsible for obstructive sleep apnea in each person.  A personalized approach is required because there is no single treatment  that works well for everyone.  Because of anatomic variation, consultation with an examination by a sleep surgeon is a critical first step in determining what surgical options are best for each patient.  In some cases, examination during sedation may be recommended in order to guide the selection of procedures.  Patients will be counseled about risks and benefits as well as the typical recovery course after surgery. Surgery is typically not a cure for a person s FABIOLA.  However, surgery will often significantly improve one s FABIOLA severity (termed  success rate ).  Even in the absence of a cure, surgery will decrease the cardiovascular risk associated with OSA7; improve overall quality of life8 (sleepiness, functionality, sleep quality, etc).  Palate Procedures:  Patients with FABIOLA often have narrowing of their airway in the region of their tonsils and uvula.  The goals of palate procedures are to widen the airway in this region as well as to help the tissues resist collapse.  Modern palate procedure techniques focus on tissue conservation and soft tissue rearrangement, rather than tissue removal.  Often the uvula is preserved in this procedure. Residual sleep apnea is common in patient after pharyngoplasty with an average reduction in sleep apnea events of 33%2.    Tongue Procedures:  ExamWhile patients are awake, the muscles that surround the throat are active and keep this region open for breathing. These muscles relax during sleep, allowing the tongue and other structures to collapse and block breathing.  There are several different tongue procedures available.  Selection of a tongue base procedure depends on characteristics seen on physical exam.  Generally, procedures are aimed at removing bulky tissues in this area or preventing the back of the tongue from falling back during sleep.  Success rates for tongue surgery range from 50-62%3.  Hypoglossal Nerve Stimulation:  Hypoglossal nerve stimulation has recently  received approval from the [place] Food and Drug Administration for the treatment of obstructive sleep apnea.  This is based on research showing that the system was safe and effective in treating sleep apnea6.  Results showed that the median AHI score decreased 68%, from 29.3 to 9.0. This therapy uses an implant system that senses breathing patterns and delivers mild stimulation to airway muscles, which keeps the airway open during sleep.  The system consists of three fully implanted components: a small generator (similar in size to a pacemaker), a breathing sensor, and a stimulation lead.  Using a small handheld remote, a patient turns the therapy on before bed and off upon awakening.    Candidates for this device must be greater than 22 years of age, have moderate to severe FABIOLA (AHI between 20-65), BMI less than 32, have tried CPAP/oral appliance without success, and have appropriate upper airway anatomy (determined by a sleep endoscopy performed by Dr. White).  Hypoglossal Nerve Stimulation Pathway:    The sleep surgeon s office will work with the patient through the insurance prior-authorization process (including communications and appeals).    Nasal Procedures:  Nasal obstruction can interfere with nasal breathing during the day and night.  Studies have shown that relief of nasal obstruction can improve the ability of some patients to tolerate positive airway pressure therapy for obstructive sleep apnea1.  Treatment options include medications such as nasal saline, topical corticosteroid and antihistamine sprays, and oral medications such as antihistamines or decongestants. Non-surgical treatments can include external nasal dilators for selected patients. If these are not successful by themselves, surgery can improve the nasal airway either alone or in combination with these other options.  Combination Procedures:  Combination of surgical procedures and other treatments may be recommended, particularly if  patients have more than one area of narrowing or persistent positional disease.  The success rate of combination surgery ranges from 66-80%2,3.    References  1. Karl FARRELL. The Role of the Nose in Snoring and Obstructive Sleep Apnoea: An Update.  Eur Arch Otorhinolaryngol. 2011; 268: 1365-73.  2.  Sameera SM; Lyudmila JA; Ranjan JR; Pallanch JF; Alex MB; Delano SG; Brittney RUIZ. Surgical modifications of the upper airway for obstructive sleep apnea in adults: a systematic review and meta-analysis. SLEEP 2010;33(10):0229-7934. Tonio BURRIS. Hypopharyngeal surgery in obstructive sleep apnea: an evidence-based medicine review.  Arch Otolaryngol Head Neck Surg. 2006 Feb;132(2):206-13.  3. Edgar YH1, Hafsa Y, Jose JOSEE. The efficacy of anatomically based multilevel surgery for obstructive sleep apnea. Otolaryngol Head Neck Surg. 2003 Oct;129(4):327-35.  4. Tonio BURRIS, Goldberg A. Hypopharyngeal Surgery in Obstructive Sleep Apnea: An Evidence-Based Medicine Review. Arch Otolaryngol Head Neck Surg. 2006 Feb;132(2):206-13.  5. Alina PJ et al. Upper-Airway Stimulation for Obstructive Sleep Apnea.  N Engl J Med. 2014 Jan 9;370(2):139-49.  6. Kelsie Y et al. Increased Incidence of Cardiovascular Disease in Middle-aged Men with Obstructive Sleep Apnea. Am J Respir Crit Care Med; 2002 166: 159-165  7. Marshal EM et al. Studying Life Effects and Effectiveness of Palatopharyngoplasty (SLEEP) study: Subjective Outcomes of Isolated Uvulopalatopharyngoplasty. Otolaryngol Head Neck Surg. 2011; 144: 623-631.

## 2020-10-23 ENCOUNTER — VIRTUAL VISIT (OUTPATIENT)
Dept: SLEEP MEDICINE | Facility: CLINIC | Age: 50
End: 2020-10-23
Payer: COMMERCIAL

## 2020-10-23 DIAGNOSIS — R06.83 SNORING: Primary | ICD-10-CM

## 2020-10-23 DIAGNOSIS — R40.0 SLEEPINESS: ICD-10-CM

## 2020-10-23 DIAGNOSIS — F51.04 CHRONIC INSOMNIA: ICD-10-CM

## 2020-10-23 PROCEDURE — 99205 OFFICE O/P NEW HI 60 MIN: CPT | Mod: 95 | Performed by: PHYSICIAN ASSISTANT

## 2020-11-02 ENCOUNTER — TELEPHONE (OUTPATIENT)
Dept: SLEEP MEDICINE | Facility: CLINIC | Age: 50
End: 2020-11-02

## 2020-11-02 DIAGNOSIS — G47.33 OSA (OBSTRUCTIVE SLEEP APNEA): Primary | ICD-10-CM

## 2020-11-02 NOTE — TELEPHONE ENCOUNTER
Reason for Call:  Other appointment    Detailed comments: patient calling to schedule her sleep study. Please follow up with patient.     Phone Number Patient can be reached at: Cell number on file:    Telephone Information:   Mobile [phone]       Best Time: anytime     Can we leave a detailed message on this number? YES    Call taken on 11/2/2020 at 10:58 AM by Abigail Moreau

## 2020-12-06 ENCOUNTER — THERAPY VISIT (OUTPATIENT)
Dept: SLEEP MEDICINE | Facility: CLINIC | Age: 50
End: 2020-12-06
Payer: COMMERCIAL

## 2020-12-06 DIAGNOSIS — G47.33 OSA (OBSTRUCTIVE SLEEP APNEA): ICD-10-CM

## 2020-12-06 PROCEDURE — 95810 POLYSOM 6/> YRS 4/> PARAM: CPT | Performed by: PSYCHIATRY & NEUROLOGY

## 2020-12-07 NOTE — PATIENT INSTRUCTIONS
[place] [facility]    1. Your sleep study will be reviewed by a sleep physician within the next few days.     2. Please follow up in the sleep clinic as scheduled, or, make an appointment with your sleep provider to be seen within two weeks to discuss the results of the sleep study.    3. If you have any questions or problems with your treatment plan, please contact your sleep clinic provider at [phone] to further manage your condition.    4. Please review your attached medication list, and, at your follow-up appointment advise your sleep clinic provider about any changes.    5. Go to [email or web address] for more information about your sleep problems.    MICHELLE Felix  December 7, 2020

## 2020-12-09 NOTE — TELEPHONE ENCOUNTER
FUTURE VISIT INFORMATION      FUTURE VISIT INFORMATION:    Date: 1/8/2021    Time: 4 PM    [place] [facility] [place]  REFERRAL INFORMATION:    Referring provider:  Dr. Naeem Chapin    Referring providers clinic:  [facility] - ENT    Reason for visit/diagnosis: FABIOLA, view sleep study    RECORDS REQUESTED FROM:       Clinic name Comments Records Status Imaging Status   [facility] ([facility]) 1/8/21, 12/4/19 - ENT OV with Dr. 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

## 2020-12-09 NOTE — PROCEDURES
" SLEEP STUDY INTERPRETATION  DIAGNOSTIC POLYSOMNOGRAPHY REPORT      Patient: SAUL CARR  YOB: 1970  Study Date: 12/6/2020  MRN: [id number]  Referring Provider: Dmitry Bella MD  Ordering Provider: Dmitry Bella MD    Indications for Polysomnography: The patient is a 50 year old Female who is 5' 5\" and weighs 195.0 lbs. Her BMI is 32.5, [place] sleepiness scale 16 and neck circumference is 38 cm. Relevant medical history includes snoring, ? apnea. A diagnostic polysomnogram was performed to evaluate for sleep apnea.    Polysomnogram Data: A full night polysomnogram recorded the standard physiologic parameters including EEG, EOG, EMG, ECG, nasal and oral airflow. Respiratory parameters of chest and abdominal movements were recorded with respiratory inductance plethysmography. Oxygen saturation was recorded by pulse oximetry. Hypopnea scoring rule used: 1B 4%.    Sleep Architecture: Sleep fragmentation  The total recording time of the polysomnogram was 464.5 minutes. The total sleep time was 346.5 minutes. Sleep latency was 14.0 minutes. REM latency was 313.5 minutes. Arousal index was 46.4 arousals per hour. Sleep efficiency was 74.6%. Wake after sleep onset was 104.0 minutes. The patient spent 4.0% of total sleep time in Stage N1, 56.3% in Stage N2, 27.1% in Stage N3, and 12.6% in REM. Time in REM supine was 0 minutes.    Respiration: This study did not demonstrate evidence suggestive of clinically significant sleep disordered breathing.  This was a good study but did not include REM supine.    Events ? The polysomnogram revealed a presence of 3 obstructive, 5 central, and - mixed apneas resulting in an apnea index of 1.4 events per hour. There were 3 obstructive hypopneas and - central hypopneas resulting in an obstructive hypopnea index of 0.5 and central hypopnea index of - events per hour. The combined apnea/hypopnea index was 1.9 events per hour (central apnea/hypopnea index was 0.9 " events per hour). The REM AHI was - events per hour. The supine AHI was 3.4 events per hour. The RERA index was 6.8 events per hour.  The RDI was 8.7 events per hour.    Snoring - was reported as mild.    Respiratory rate and pattern - was notable for normal respiratory rate and pattern.    Sustained Sleep Associated Hypoventilation - Transcutaneous carbon dioxide monitoring was not used.    Sleep Associated Hypoxemia - (Greater than 5 minutes O2 sat at or below 88%) was not present. Baseline oxygen saturation was 96.7%. Lowest oxygen saturation was 90.7%. Time spent less than or equal to 88% was 0 minutes. Time spent less than or equal to 89% was 0 minutes.    Movement Activity: The patient had elevated periodic limb movements (PLMs). The majority of these WERE associated with cortical arousal.    Periodic Limb Activity - There were 224 PLMs during the entire study. The PLM index was 38.8 movements per hour. The PLM Arousal Index was 25.8 per hour.    REM EMG Activity - Excessive muscle activity was not present.    Nocturnal Behavior - Abnormal sleep related behaviors were not noted.    Bruxism - None apparent.    Cardiac Summary: Sinus, intermittent tachycardia  The average pulse rate was 76.3 bpm. The minimum pulse rate was 56.9 bpm while the maximum pulse rate was 117.2 bpm.      Assessment:     This study did not demonstrate evidence suggestive of clinically significant sleep disordered breathing.  This was a good study but did not include REM supine.     The patient had elevated periodic limb movements (PLMs). The majority of these WERE associated with cortical arousal.    Recommendations:    Patient may be reassured that, per this study they do not have clinically significant sleep disordered breathing as long as they do not sleep supine.  If the patient does sleep supine more frequently at home could consider repeat polysomnography with emphasis on supine positioning.  o interested in treating snoring  options include dental appliance and upper airway surgery.    Recommend the patient optimize the duration and circadian timing of sleep.     Advice regarding the risks of drowsy driving.    Suggest optimizing sleep schedule and avoiding sleep deprivation.    Weight management    Treatment of PLMs (dopaminergic agents or delta-2 ligands) should be targeted at patients who either have wakeful motor restlessness or those in whom there is a high clinical suspicion of periodic limb movement disorder and not for elevated PLMs alone    Diagnostic Codes: G47.9      Dmitry Bella MD 12-9-2020

## 2020-12-10 LAB — SLPCOMP: NORMAL

## 2020-12-21 NOTE — PROGRESS NOTES
"Chio Bledsoe is a 50 year old female who is being evaluated via a billable telephone visit.      The patient has been notified of following:     \"This telephone visit will be conducted via a call between you and your physician/provider. We have found that certain health care needs can be provided without the need for a physical exam.  This service lets us provide the care you need with a short phone conversation.  If a prescription is necessary we can send it directly to your pharmacy.  If lab work is needed we can place an order for that and you can then stop by our lab to have the test done at a later time.    Telephone visits are billed at different rates depending on your insurance coverage. During this emergency period, for some insurers they may be billed the same as an in-person visit.  Please reach out to your insurance provider with any questions.    If during the course of the call the physician/provider feels a telephone visit is not appropriate, you will not be charged for this service.\"    Patient has given verbal consent for Telephone visit?  Yes    What phone number would you like to be contacted at? [phone]     How would you like to obtain your AVS? App[employer]    Sleep Follow-Up Virtual Visit:    Date on this visit: 12/22/2020    Chio Bledsoe has a phone visit today for follow-up of her sleep study done on 12/6/2020. She was initially seen at the [facility] for snoring and poor sleep quality for 4 years. 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    PSG Results:  Wt: 195#  Sleep latency 14 minutes with 25 mg trazodone. She ended up taking another 25 mg later.  REM achieved.   REM latency 313.5 minutes.  Sleep efficiency 74.6%. Total sleep time 346.5 minutes.    Sleep architecture:  Stage 1, 4% (5%), stage 2, 56.3% (45-55%), stage 3, 27.1% (15-20%), stage REM, 12.6% (20-25%).  AHI was " 1.9/hr, with desaturations down to 92%. S/He spent 0 minutes below 90% SpO2 and 0 minutes below 89% SpO2. RDI 8.7/hr.  REM RDI 6.9/hr (AHI 0/hr, All REM was lateral), consistent with no REM FABIOLA.  Supine RDI 10.8/hr (AHI 3.4/hr), consistent with mild SUPINE UARS. Her non-supine RDI was 7.9/hr (AHI 1.4/hr).  Periodic Limb Movement Index 38.8/hour, 25.8/hr associated with arousals.      CPAP titration:  CPAP was not indicated. These findings were reviewed with the patient.    She said the sleep study was one of the worst of the year. Part of that was due to her trying to go to bed by 10 PM and normally going to bed around midnight. She was really groggy the next day because of taking the extra trazodone. She also normally sleeps prone, but couldn't with the wires.   At home, she sleeps pretty soundly from about 4-8 AM.     She denies restless legs before bed and has not been told that she kicks in her sleep. About 5 nights per week, she will go sleep on the couch because she is worried about disrupting her  with her tossing and turning. With trazodone, she does not get up as much or notice jerk awakenings like she does at home.     Past medical/surgical history, family history, social history, medications and allergies were reviewed.      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 PSG showed no significant apnea.  Plan: We discussed getting 30 minutes of bright light exposure in the morning, either with the sun or a SAD Lamp of 10,000 lux intensity. Avoid bright  light, including electronics, in the hour before bedtime. Take 1 mg melatonin 3-5 hours prior to natural sleep onset. Keep a consistent sleep schedule, avoiding naps and sleeping in.  Limit time in bed to 7.5 hours. Avoid sleep outside of that schedule. Go to bed no earlier than midnight and sleep no later than 7:30 AM. After a couple of weeks, move your wake time to 7 AM. You may move your bedtime to 11:30 PM if you are sleeping better during the night and are tired in the day. May consider gabapentin for the PLMD instead of trazodone.     She will follow up with me in about 6 week(s).     36 minutes (10:02 AM-10:38 AM) spent with patient, all of which were spent counseling, consulting, coordinating plan of care.      Bennett Goltz, PA-C    CC: No ref. provider found

## 2020-12-22 ENCOUNTER — VIRTUAL VISIT (OUTPATIENT)
Dept: SLEEP MEDICINE | Facility: CLINIC | Age: 50
End: 2020-12-22
Payer: COMMERCIAL

## 2020-12-22 DIAGNOSIS — F51.04 CHRONIC INSOMNIA: Primary | ICD-10-CM

## 2020-12-22 PROCEDURE — 99214 OFFICE O/P EST MOD 30 MIN: CPT | Mod: 95 | Performed by: PHYSICIAN ASSISTANT

## 2020-12-22 NOTE — PATIENT INSTRUCTIONS
Instructions for treating Delayed Sleep Phase Syndrome:    Limit time in bed to 7.5 hours. Avoid sleep outside of that schedule. Go to bed no earlier than midnight and sleep no later than 7:30 AM. After a couple of weeks, move your wake time to 7 AM. You may move your bedtime to 11:30 PM if you are sleeping better during the night and are tired in the day.     Delayed Sleep Phase Syndrome (DSPS) means that your body's internal timing is set late compared to the 24 hour day. Therefore, it is often difficult to get up on time for work in the morning and sometimes difficult to fall asleep on time, in order to get enough sleep. People with DSPS often tend to like to stay up late on weekends and sleep in until between 10 AM and noon, sometimes even later.This is actually a bad habit that will perpetuate the problem. It reinforces your body's tendency to be on that later schedule.    You should go to bed when you are sleepy and ready to sleep. During this entire process, you should not engage in activities that may make it worse, such as watching TV in bed, leaving the TV on all night, drinking any caffeine 6 hours before bed or exercising 1-2 hours before bed.     Start taking Melatonin, 1 mg tablet 3-5 hours before the time that you fall asleep on average (not your desired bedtime or time that you get in bed, but the time you normally fall asleep on your own).     Upon awakening, get exposure to sun-light for about 30-45 minutes. You do not need to look at the sun, in fact, this is dangerous. Reading the paper with the sun shining on you is adequate.  Alternatively, you may use a Seasonal Affective Disorder Lamp (intensity 10,000 Lux) instead of the sun. The lamp should be positioned 1-2 arms lengths away from you. They lamps are sold at Home Medical Companies such as [employer] or [employer]. A prescription can be written to get insurance coverage in some cases. They are also sold on [employer].    Using  the light and melatonin should help march your internal clock (known as your circadian rhythms) gradually earlier. As your bedtime advances, remember to take your melatonin earlier, keeping it 5 hours before your fall asleep time.    Avoid naps and sleeping in because sleeping during the day will delay your body's clock and you will have to start from scratch.     More information about light therapy:  If the cost of any light box is too much, you can also purchase a compact fluorescent all spectrum light bulb at a local hardware store for about $8.  The most commonly available bulb is 1400 lumen.  You would need two of these positioned within 1 meter of yourself to be equivalent to 2,500 lux.  The bulbs can be placed in a standard light fixture.  Additionally, they can be placed in a mountable fixture that is used in greenhouses.  Mountable fixtures are also available at hardware stores for about $9.  Do not look directly at the light.  If you have any concerns regarding the safety of bright light therapy for you, it is recommended that you consult an ophthalmologist before using a light box.  If you have a condition that makes your eyes very sensitive to light, macular degeneration, a family history of such problems, or diabetic changes to your eyes, consult an ophthalmologist before using a light box. If you have anxiety disorder and have an increase in anxiety discontinue use.

## 2021-01-08 ENCOUNTER — OFFICE VISIT (OUTPATIENT)
Dept: OTOLARYNGOLOGY | Facility: CLINIC | Age: 51
End: 2021-01-08
Payer: COMMERCIAL

## 2021-01-08 ENCOUNTER — PRE VISIT (OUTPATIENT)
Dept: OTOLARYNGOLOGY | Facility: CLINIC | Age: 51
End: 2021-01-08

## 2021-01-08 ENCOUNTER — PREP FOR PROCEDURE (OUTPATIENT)
Dept: OTOLARYNGOLOGY | Facility: CLINIC | Age: 51
End: 2021-01-08

## 2021-01-08 ENCOUNTER — TELEPHONE (OUTPATIENT)
Dept: OTOLARYNGOLOGY | Facility: CLINIC | Age: 51
End: 2021-01-08

## 2021-01-08 VITALS — HEART RATE: 94 BPM | TEMPERATURE: 96.8 F | OXYGEN SATURATION: 99 % | BODY MASS INDEX: 32.45 KG/M2 | HEIGHT: 65 IN

## 2021-01-08 VITALS — BODY MASS INDEX: 32.45 KG/M2 | OXYGEN SATURATION: 99 % | TEMPERATURE: 96.8 F | HEART RATE: 94 BPM | HEIGHT: 65 IN

## 2021-01-08 DIAGNOSIS — F51.01 PRIMARY INSOMNIA: ICD-10-CM

## 2021-01-08 DIAGNOSIS — D14.0 INVERTED PAPILLOMA OF LATERAL NASAL WALL: ICD-10-CM

## 2021-01-08 DIAGNOSIS — R09.81 NASAL CONGESTION: Primary | ICD-10-CM

## 2021-01-08 DIAGNOSIS — D14.0 INVERTED PAPILLOMA OF MAXILLARY SINUS: Primary | ICD-10-CM

## 2021-01-08 DIAGNOSIS — R06.83 SNORING: Primary | ICD-10-CM

## 2021-01-08 PROCEDURE — 99213 OFFICE O/P EST LOW 20 MIN: CPT | Performed by: OTOLARYNGOLOGY

## 2021-01-08 PROCEDURE — 31231 NASAL ENDOSCOPY DX: CPT | Performed by: OTOLARYNGOLOGY

## 2021-01-08 PROCEDURE — 99213 OFFICE O/P EST LOW 20 MIN: CPT | Mod: 25 | Performed by: OTOLARYNGOLOGY

## 2021-01-08 RX ORDER — CEFAZOLIN SODIUM 2 G/50ML
2 SOLUTION INTRAVENOUS
Status: CANCELLED | OUTPATIENT
Start: 2021-01-08

## 2021-01-08 RX ORDER — CEFAZOLIN SODIUM 1 G/50ML
1 INJECTION, SOLUTION INTRAVENOUS SEE ADMIN INSTRUCTIONS
Status: CANCELLED | OUTPATIENT
Start: 2021-01-08

## 2021-01-08 RX ORDER — DEXAMETHASONE SODIUM PHOSPHATE 4 MG/ML
10 INJECTION, SOLUTION INTRA-ARTICULAR; INTRALESIONAL; INTRAMUSCULAR; INTRAVENOUS; SOFT TISSUE ONCE
Status: CANCELLED | OUTPATIENT
Start: 2021-01-08 | End: 2021-01-08

## 2021-01-08 ASSESSMENT — PAIN SCALES - GENERAL
PAINLEVEL: NO PAIN (0)
PAINLEVEL: NO PAIN (0)

## 2021-01-08 NOTE — LETTER
1/8/2021      RE: Chio Bledsoe  1987 [facility] [address]           [facility]  Return Patient Visit      Encounter date: January 8, 2021    Referring Provider:   Dr. Danny Gabriel MD    Chief Complaint: inverted papilloma; snoring    History of Present Illness: Chio Bledsoe is a 49 year old woman who is referred to me by Dr. Gabriel for inverted papilloma. She has undergone multiple ESS for recurrent IP of the right maxillary by Dr. Gabriel, most recently she underwent ESS with medial maxillectomy in 2010. She was lost to follow up for several years and returned to Dr. Gabriel's office with complaints of poor sleep, snoring, and she was curious about recurrence of IP.     Interval visit  Follow-up today because of Covid pandemic  In the interim had sleep study as seeing my partner, Dr. Gloria White  She is working closely with sleep medicine  Would like to address inverted papilloma now  Has very little in the way of symptoms  Denies epistaxis or persistent facial pain    Review of systems: A 14-point review of systems has been conducted and was negative for any notable symptoms, except as dictated in the history of present illness.     Past Medical History:   Diagnosis Date     Polyp of nasal cavity         Past Surgical History:   Procedure Laterality Date     SURGICAL HISTORY OF -       sinus surgery X 3     SURGICAL HISTORY OF -       wisdom teeth extraction     SURGICAL HISTORY OF -   1/10    breaset reduction     SURGICAL HISTORY OF -   12-18-10    Right endoscopic maxillary antrostomy with removal of contents with image guidance        Family History   Problem Relation Age of Onset     C.A.D. Mother      Hypertension Mother      Lipids Mother      Diabetes No family hx of      Cerebrovascular Disease No family hx of      Breast Cancer No family hx of      Cancer - colorectal No family hx of         Social History     Socioeconomic History     Marital status:       Spouse name: Not on file     Number of children: Not on file     Years of education: Not on file     Highest education level: Not on file   Occupational History     Not on file   Social Needs     Financial resource strain: Not on file     Food insecurity     Worry: Not on file     Inability: Not on file     Transportation needs     Medical: Not on file     Non-medical: Not on file   Tobacco Use     Smoking status: Never Smoker     Smokeless tobacco: Never Used   Substance and Sexual Activity     Alcohol use: Yes     Comment: 1-2 drinks per month     Drug use: No     Sexual activity: Not on file   Lifestyle     Physical activity     Days per week: Not on file     Minutes per session: Not on file     Stress: Not on file   Relationships     Social connections     Talks on phone: Not on file     Gets together: Not on file     Attends [employer] service: Not on file     Active member of club or organization: Not on file     Attends meetings of clubs or organizations: Not on file     Relationship status: Not on file     Intimate partner violence     Fear of current or ex partner: Not on file     Emotionally abused: Not on file     Physically abused: Not on file     Forced sexual activity: Not on file   Other Topics Concern     Parent/sibling w/ CABG, MI or angioplasty before 65F 55M? Not Asked   Social History Narrative     Not on file        Physical Exam:  Vital signs: There were no vitals taken for this visit.   General Appearance: No acute distress, appropriate demeanor, conversant  Eyes: moist conjunctivae; EOMI; pupils symmetric; visual acuity grossly intact; no proptosis  Head: normocephalic; overall symmetric appearance without deformity  Face: overall symmetric without deformity; HB I-VI  Ears: Normal appearance of external ear; external meatus normal in appearance; TMs intact without perforation bilaterally;   Nose: No external deformity; septum relatively midline; shaggy polypoid mass evident in right nasal  cavity   Oral Cavity/oropharynx: Normal appearance of mucosa; tongue midline; no mass or lesions; tonsils 1+; oropharynx without obvious mucosal abnormality; Mallampati class 3 oral/oropharyngeal airway  Neck: no palpable lymphadenopathy; thyroid without palpable nodules  Lungs: symmetric chest rise; no wheezing  CV: Good distal perfusion; normal hear rate  Extremities: No deformity  Neurologic Exam: Cranial nerves II-XII are grossly intact; no focal deficit      Procedure Note  Procedure performed: Rigid nasal endoscopy  Indication: To evaluate for sinonasal pathology not visualized on routine anterior rhinoscopy  Anesthesia: 4% topical lidocaine with 0.05% oxymetazoline  Description of procedure: A 30 degree, 3 mm rigid endoscope was inserted into bilateral nasal cavities and the nasal valves, nasal cavity, middle meatus, sphenoethmoid recess, and nasopharynx were thoroughly evaluated for evidence of obstruction, edema, purulence, polyps and/or mass/lesion.     Findings  RT: shaggy polypoid lesion of right nasal cavity, lateral nasal wall; difficult to determine of max sinus is auto-obliterated with mucosal thickening or tumor present in max sinus; appears stable when compared to prior exam  LT: nasal cavity clear; MM clear    The patient tolerated the procedure well without complication.     Laboratory Review:  n/a    Imaging Review:  Reviewed updated CT sinus obtained prior to visit on 12/4/2019: RT max sinus/nasal cavity mass c/w known inverted papilloma; there is yareli-osteogenesis of RT max sinus    Pathology Review:  9/24/2019  SPECIMEN(S):   Papiloma     FINAL DIAGNOSIS:   SINUS MASS, SITE NOT SPECIFIED, EXCISIONAL BIOPSY:   - Sinonasal (Schneiderian) papilloma, inverted type, inflamed.   - No evidence of dysplasia or malignancy identified.     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   Chio now desires  excision of her recurrent inverted papilloma.  I think this is feasible for endoscopically but may require a Carrero-Jean-Paul approach as well.  We discussed risks associated with surgery.  We will go ahead and schedule.    I discussed the risks, benefits, and alternatives to endoscopic sinonasal surgery, including but not limited to: pain, bleeding, infection, CSF leak, orbital injury resulting in vision changes and/or vision loss, septal perforation and/or hematoma, dental hypesthesia, facial numbness, need for continued medical management after surgery, and need for additional procedures, among others. She was allowed to ask questions, which I answered to the best of my ability. After this discussion, surgery was elected.  We also discussed risks specific to Carrero-Jean-Paul approach, including dental injury.     Plan:  1.  Plan for revision right image guided endoscopic excision of recurrent inverted papilloma, possible Carrero-Jean-Paul approach.   2.  Return to clinic postop    Naeem Chapin MD    [facility]  Rhinology  Endoscopic Skull Base Surgery  [facility]  Department of Otolaryngology - Head & Neck Surgery

## 2021-01-08 NOTE — NURSING NOTE
Pre-surgery teaching completed for: Right maxillary antrostomy  Physician:  Dr. Naeem Chapin    Teaching completed via phone: No  Teaching completed in clinic:  Yes    Teaching completed with patient   present Not applicable    Surgical booklet given  Yes  Pre-surgery scrub given Yes    Pneumovax guidelines given:  Not applicable    Reviewed pre-surgical guidelines: Yes    Pre-surgery physical exam requirements:  Yes  NPO requirements: Yes    Reviewed post surgery expectations:  Yes    Recommended post surgery follow up:  1 week    Tamika Perla RN

## 2021-01-08 NOTE — NURSING NOTE
"Chief Complaint   Patient presents with     Consult     referral from Dr Chapin, insomnia       Pulse 94, temperature 96.8  F (36  C), temperature source Temporal, height 1.651 m (5' 5\"), SpO2 99 %, not currently breastfeeding.    Angeline Raygoza, EMT  "

## 2021-01-08 NOTE — LETTER
1/8/2021       RE: Chio Bledsoe  1987 [facility] [address]     Dear Colleague,    Thank you for referring your patient, Chio Bledsoe, to the [facility] [place] at [facility]. Please see a copy of my visit note below.    CC: sleep issues    HPI: Patient is followed by Dr. Naeem Chapin for inverting papilloma that does require revision surgery.  At her last clinic with him she mentions some sleep issues and he therefore recommended a sleep study.  Patient did have a sleep study performed through [facility] on December 6, 2020.  Overall AHI was 1.9.  Supine AHI was 3.4.  Nonsupine AHI was 1.4 lowest oxygen saturation was 92%.  She has had a conversation with Bennett Goltz regarding the results of her sleep study.  According to her while she did not have significant sleep apnea he did note some other potential sleep issues that could be disruptive to her sleep.  She does have some chronic insomnia.  She also has some delayed sleep preference.  And she possibly has some periodic limb movement disorder that could be affecting her sleep.  He has made some recommendations for her that she is starting to include.  She is taking melatonin.  He also takes trazodone on occasion.  She finds neither the melatonin or the trazodone helps her initiate sleep.  The trazodone itself does help her's stay asleep when she is asleep though.  She really tries to minimize her use of trazodone due to concerns for developing a dependence on it.  At this time she is focused on working with  to have her surgery for the inverting papilloma recurrence.  After she is healed from the surgery she will refocus on her sleep.  She does plan to follow-up with sleep medicine.    A/P:  Patient sleep issues appear to be more medical in origin.  At this time sleep surgery does not play a role in the management of her sleep issues.  She could  potentially add in some CBD to see if that helps her with relaxing at night.  It is unlikely to help her initiate sleep though.  But I would agree that she should continue to work with sleep medicine as her primary source to help her with her overall sleep issues.  We did talk that tonsils can sometimes contribute to obstructive sleep apnea given that she did not have significant sleep apnea on her sleep study it is unlikely that her tonsils are an issue for her and unlikely to cause apnea down the road.    I spent a total of 20 minutes face-to-face with Chio Bledsoe during today's office visit.  Over 50% of this time was spent counseling the patient on and/or coordinating care as documented in my assessment and plan.        Again, thank you for allowing me to participate in the care of your patient.      Sincerely,    Chio White MD

## 2021-01-08 NOTE — PROGRESS NOTES
[facility]                   Return Patient Visit      Encounter date: January 8, 2021    Referring Provider:   Dr. Danny Gabriel MD    Chief Complaint: inverted papilloma; snoring    History of Present Illness: Chio Bledsoe is a 49 year old woman who is referred to me by Dr. Gabriel for inverted papilloma. She has undergone multiple ESS for recurrent IP of the right maxillary by Dr. Gabriel, most recently she underwent ESS with medial maxillectomy in 2010. She was lost to follow up for several years and returned to Dr. Gabriel's office with complaints of poor sleep, snoring, and she was curious about recurrence of IP.     Interval visit  Follow-up today because of Covid pandemic  In the interim had sleep study as seeing my partner, Dr. Gloria White  She is working closely with sleep medicine  Would like to address inverted papilloma now  Has very little in the way of symptoms  Denies epistaxis or persistent facial pain    Review of systems: A 14-point review of systems has been conducted and was negative for any notable symptoms, except as dictated in the history of present illness.     Past Medical History:   Diagnosis Date     Polyp of nasal cavity         Past Surgical History:   Procedure Laterality Date     SURGICAL HISTORY OF -       sinus surgery X 3     SURGICAL HISTORY OF -       wisdom teeth extraction     SURGICAL HISTORY OF -   1/10    breaset reduction     SURGICAL HISTORY OF -   12-18-10    Right endoscopic maxillary antrostomy with removal of contents with image guidance        Family History   Problem Relation Age of Onset     C.A.D. Mother      Hypertension Mother      Lipids Mother      Diabetes No family hx of      Cerebrovascular Disease No family hx of      Breast Cancer No family hx of      Cancer - colorectal No family hx of         Social History     Socioeconomic History     Marital status:      Spouse name: Not on file     Number of children: Not on  file     Years of education: Not on file     Highest education level: Not on file   Occupational History     Not on file   Social Needs     Financial resource strain: Not on file     Food insecurity     Worry: Not on file     Inability: Not on file     Transportation needs     Medical: Not on file     Non-medical: Not on file   Tobacco Use     Smoking status: Never Smoker     Smokeless tobacco: Never Used   Substance and Sexual Activity     Alcohol use: Yes     Comment: 1-2 drinks per month     Drug use: No     Sexual activity: Not on file   Lifestyle     Physical activity     Days per week: Not on file     Minutes per session: Not on file     Stress: Not on file   Relationships     Social connections     Talks on phone: Not on file     Gets together: Not on file     Attends [employer] service: Not on file     Active member of club or organization: Not on file     Attends meetings of clubs or organizations: Not on file     Relationship status: Not on file     Intimate partner violence     Fear of current or ex partner: Not on file     Emotionally abused: Not on file     Physically abused: Not on file     Forced sexual activity: Not on file   Other Topics Concern     Parent/sibling w/ CABG, MI or angioplasty before 65F 55M? Not Asked   Social History Narrative     Not on file        Physical Exam:  Vital signs: There were no vitals taken for this visit.   General Appearance: No acute distress, appropriate demeanor, conversant  Eyes: moist conjunctivae; EOMI; pupils symmetric; visual acuity grossly intact; no proptosis  Head: normocephalic; overall symmetric appearance without deformity  Face: overall symmetric without deformity; HB I-VI  Ears: Normal appearance of external ear; external meatus normal in appearance; TMs intact without perforation bilaterally;   Nose: No external deformity; septum relatively midline; shaggy polypoid mass evident in right nasal cavity   Oral Cavity/oropharynx: Normal appearance of  mucosa; tongue midline; no mass or lesions; tonsils 1+; oropharynx without obvious mucosal abnormality; Mallampati class 3 oral/oropharyngeal airway  Neck: no palpable lymphadenopathy; thyroid without palpable nodules  Lungs: symmetric chest rise; no wheezing  CV: Good distal perfusion; normal hear rate  Extremities: No deformity  Neurologic Exam: Cranial nerves II-XII are grossly intact; no focal deficit      Procedure Note  Procedure performed: Rigid nasal endoscopy  Indication: To evaluate for sinonasal pathology not visualized on routine anterior rhinoscopy  Anesthesia: 4% topical lidocaine with 0.05% oxymetazoline  Description of procedure: A 30 degree, 3 mm rigid endoscope was inserted into bilateral nasal cavities and the nasal valves, nasal cavity, middle meatus, sphenoethmoid recess, and nasopharynx were thoroughly evaluated for evidence of obstruction, edema, purulence, polyps and/or mass/lesion.     Findings  RT: shaggy polypoid lesion of right nasal cavity, lateral nasal wall; difficult to determine of max sinus is auto-obliterated with mucosal thickening or tumor present in max sinus; appears stable when compared to prior exam  LT: nasal cavity clear; MM clear    The patient tolerated the procedure well without complication.     Laboratory Review:  n/a    Imaging Review:  Reviewed updated CT sinus obtained prior to visit on 12/4/2019: RT max sinus/nasal cavity mass c/w known inverted papilloma; there is yareli-osteogenesis of RT max sinus    Pathology Review:  9/24/2019  SPECIMEN(S):   Papiloma     FINAL DIAGNOSIS:   SINUS MASS, SITE NOT SPECIFIED, EXCISIONAL BIOPSY:   - Sinonasal (Schneiderian) papilloma, inverted type, inflamed.   - No evidence of dysplasia or malignancy identified.     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   Chio now desires excision of her recurrent inverted papilloma.  I think  this is feasible for endoscopically but may require a Carrero-Jean-Paul approach as well.  We discussed risks associated with surgery.  We will go ahead and schedule.    I discussed the risks, benefits, and alternatives to endoscopic sinonasal surgery, including but not limited to: pain, bleeding, infection, CSF leak, orbital injury resulting in vision changes and/or vision loss, septal perforation and/or hematoma, dental hypesthesia, facial numbness, need for continued medical management after surgery, and need for additional procedures, among others. She was allowed to ask questions, which I answered to the best of my ability. After this discussion, surgery was elected.  We also discussed risks specific to Carrero-Jean-Paul approach, including dental injury.     Plan:  1.  Plan for revision right image guided endoscopic excision of recurrent inverted papilloma, possible Carrero-Jean-Paul approach.   2.  Return to clinic postop    Naeem Chapin MD    [facility]  Rhinology  Endoscopic Skull Base Surgery  [facility]  Department of Otolaryngology - Head & Neck Surgery

## 2021-01-08 NOTE — NURSING NOTE
"Chief Complaint   Patient presents with     RECHECK     Follow up from sleep study       Pulse 94, temperature 96.8  F (36  C), temperature source Temporal, height 1.651 m (5' 5\"), SpO2 99 %, not currently breastfeeding.    Angeline Raygzoa, EMT  "

## 2021-01-08 NOTE — PATIENT INSTRUCTIONS
1.  You were seen in the ENT Clinic today by Dr. Chapin.  If you have any questions or concerns after your appointment, please call [phone].    2.  Plan is to move forward with a Right endoscopic sinus surgery. This is an outpatient procedure that is done in the operating room. You will need a  the day of surgery.    3. You will need to consult with your primary care MD for a pre-op physical.  Forms for this were sent home with you today.    4. Criss, our surgery scheduler will call you to schedule surgery. You can also reach her at [phone]    5.  Please return to the clinic 1 week after surgery       Please call our clinic for any questions, concerns, and/or worsening symptoms.      Clinic #[phone]       Option 1 for scheduling.    Thank you for allowing us to be a part of your care!    Tamika CORONEL RNCC    If you need to reach me my direct line is: [phone]         BEFORE SURGERY:    -NO IBUPROFEN , MOTRIN, ALEVE, GARLIC SUPPLEMENTS, ASPIRIN PRODUCTS  OR FISH OIL FOR 7 DAYS PRIOR TO SURGERY. Tylenol is fine, generally.( You will need specific instructions from primary care if on blood thinners).    -Read pre-operative pamphlets/ booklets related to your procedure, call or contact the clinic with questions.    - Pre-op History and Physical to be done by primary care physician within 30 days of surgery date. This form is in the packet.    - Restrictions on food and fluid the day of surgery as per packet. Generally, nothing solid to eat 8 hours prior to the procedure. Okay to have clear liquids up to 2 hours before (this includes; water, apple juice, black coffee without any cream or sugar).    - Pre-op soap, neck down, directions are in your packet. Use the night before surgery and the day of surgery.     - You will need a  the day of surgery.    - You will need someone to remain with you for 24 hours following your procedure.       AFTER SURGERY:    - You will follow up one week after  surgery    - Call [phone] for scheduling or general questions     For urgent needs after hours call [phone]. You will speak with the hospital  and should ask to have the ENT resident on call paged.    - Please contact our clinic if you note any of the following:          -Foul smelling drainage from your surgical site          -Persistent pain after pain medication          -Fever>100 degrees x 24 hours or longer          -Significant dizziness, especially of new onset          -Any questions or concerns about your care    Tamika CORONEL RN    Cleaning of the Nasal or Sinus Cavity    Please follow all three steps.  Nasal irrigation (cleaning) should be done 2 times/day 24 hours after surgery.    Preparation:  1.  Wash your hands  2.  We suggest that you buy the [employer] Sinus Rinse Kit  3.  Use distilled water or tap water that has been boiled and brought to room temperature. This is important because serious infections can result from using tap water that is not clean. These infections are very rare, but it's better to be absolutely safe.  4.  Fill the irrigation bottle with room temperature water (distilled or boiled tap water) and add mixture (pre made packet or homemade recipe).        If you wish to make a homemade recipe:        Mix 1/4 teaspoon salt (kosher,non-iodine salt) with 1/4 teaspoon baking soda in each bottle.    Cleansing Irrigation/Sinus Rinse:    1.  Lean forward over a sink and insert the rinse bottle applicator into the right side of your nose. Make sure to point the applicator towards the back of your head.     2.  Tilt head down and to the left side.  With your mouth open (breathing through your mouth), gently direct the water around the inside of your nose until clear fluid starts to drain from the opposite nostril.  This is called flushing or irrigation.    3.  When you have used 1/4 to 1/2 or the solution, switch to the left nostril.    4.  To irrigate the left nostril, tilt  your head down and to the right side.  With your mouth open (breathing through your mouth),  gently direct the water around the inside of your nose until clear fluid starts to drain from the opposite nostril.     Cleaning the Equipment:  1.  Throw away any leftover solution  2.  Clean the rinse bottle and cap with clear water. Air dry.      Call the ENT Clinic if you have any questions or concerns at [phone]

## 2021-01-08 NOTE — PROGRESS NOTES
CC: sleep issues    HPI: Patient is followed by Dr. Naeem Chapin for inverting papilloma that does require revision surgery.  At her last clinic with him she mentions some sleep issues and he therefore recommended a sleep study.  Patient did have a sleep study performed through [facility] on December 6, 2020.  Overall AHI was 1.9.  Supine AHI was 3.4.  Nonsupine AHI was 1.4 lowest oxygen saturation was 92%.  She has had a conversation with Bennett Goltz regarding the results of her sleep study.  According to her while she did not have significant sleep apnea he did note some other potential sleep issues that could be disruptive to her sleep.  She does have some chronic insomnia.  She also has some delayed sleep preference.  And she possibly has some periodic limb movement disorder that could be affecting her sleep.  He has made some recommendations for her that she is starting to include.  She is taking melatonin.  He also takes trazodone on occasion.  She finds neither the melatonin or the trazodone helps her initiate sleep.  The trazodone itself does help her's stay asleep when she is asleep though.  She really tries to minimize her use of trazodone due to concerns for developing a dependence on it.  At this time she is focused on working with  to have her surgery for the inverting papilloma recurrence.  After she is healed from the surgery she will refocus on her sleep.  She does plan to follow-up with sleep medicine.    A/P:  Patient sleep issues appear to be more medical in origin.  At this time sleep surgery does not play a role in the management of her sleep issues.  She could potentially add in some CBD to see if that helps her with relaxing at night.  It is unlikely to help her initiate sleep though.  But I would agree that she should continue to work with sleep medicine as her primary source to help her with her overall sleep issues.  We did talk that tonsils can sometimes contribute to obstructive  sleep apnea given that she did not have significant sleep apnea on her sleep study it is unlikely that her tonsils are an issue for her and unlikely to cause apnea down the road.    I spent a total of 20 minutes face-to-face with Chio Bledsoe during today's office visit.  Over 50% of this time was spent counseling the patient on and/or coordinating care as documented in my assessment and plan.

## 2021-01-08 NOTE — LETTER
1/8/2021       RE: Chio Bledsoe  [address] [id number]     Dear Colleague,    Thank you for referring your patient, Chio Bledsoe, to the [facility]. Please see a copy of my visit note below.                       [facility]                   Return Patient Visit      Encounter date: January 8, 2021    Referring Provider:   Dr. Danny Gabriel MD    Chief Complaint: inverted papilloma; snoring    History of Present Illness: Chio Bledsoe is a 49 year old woman who is referred to me by Dr. Gabriel for inverted papilloma. She has undergone multiple ESS for recurrent IP of the right maxillary by Dr. Gabriel, most recently she underwent ESS with medial maxillectomy in 2010. She was lost to follow up for several years and returned to Dr. Gabriel's office with complaints of poor sleep, snoring, and she was curious about recurrence of IP.     Interval visit  Follow-up today because of Covid pandemic  In the interim had sleep study as seeing my partner, Dr. Gloria White  She is working closely with sleep medicine  Would like to address inverted papilloma now  Has very little in the way of symptoms  Denies epistaxis or persistent facial pain    Review of systems: A 14-point review of systems has been conducted and was negative for any notable symptoms, except as dictated in the history of present illness.     Past Medical History:   Diagnosis Date     Polyp of nasal cavity         Past Surgical History:   Procedure Laterality Date     SURGICAL HISTORY OF -       sinus surgery X 3     SURGICAL HISTORY OF -       wisdom teeth extraction     SURGICAL HISTORY OF -   1/10    breaset reduction     SURGICAL HISTORY OF -   12-18-10    Right endoscopic maxillary antrostomy with removal of contents with image guidance        Family History   Problem Relation Age of Onset     C.A.D. Mother       Hypertension Mother      Lipids Mother      Diabetes No family hx of      Cerebrovascular Disease No family hx of      Breast Cancer No family hx of      Cancer - colorectal No family hx of         Social History     Socioeconomic History     Marital status:      Spouse name: Not on file     Number of children: Not on file     Years of education: Not on file     Highest education level: Not on file   Occupational History     Not on file   Social Needs     Financial resource strain: Not on file     Food insecurity     Worry: Not on file     Inability: Not on file     Transportation needs     Medical: Not on file     Non-medical: Not on file   Tobacco Use     Smoking status: Never Smoker     Smokeless tobacco: Never Used   Substance and Sexual Activity     Alcohol use: Yes     Comment: 1-2 drinks per month     Drug use: No     Sexual activity: Not on file   Lifestyle     Physical activity     Days per week: Not on file     Minutes per session: Not on file     Stress: Not on file   Relationships     Social connections     Talks on phone: Not on file     Gets together: Not on file     Attends Jain service: Not on file     Active member of club or organization: Not on file     Attends meetings of clubs or organizations: Not on file     Relationship status: Not on file     Intimate partner violence     Fear of current or ex partner: Not on file     Emotionally abused: Not on file     Physically abused: Not on file     Forced sexual activity: Not on file   Other Topics Concern     Parent/sibling w/ CABG, MI or angioplasty before 65F 55M? Not Asked   Social History Narrative     Not on file        Physical Exam:  Vital signs: There were no vitals taken for this visit.   General Appearance: No acute distress, appropriate demeanor, conversant  Eyes: moist conjunctivae; EOMI; pupils symmetric; visual acuity grossly intact; no proptosis  Head: normocephalic; overall symmetric appearance without deformity  Face:  overall symmetric without deformity; HB I-VI  Ears: Normal appearance of external ear; external meatus normal in appearance; TMs intact without perforation bilaterally;   Nose: No external deformity; septum relatively midline; shaggy polypoid mass evident in right nasal cavity   Oral Cavity/oropharynx: Normal appearance of mucosa; tongue midline; no mass or lesions; tonsils 1+; oropharynx without obvious mucosal abnormality; Mallampati class 3 oral/oropharyngeal airway  Neck: no palpable lymphadenopathy; thyroid without palpable nodules  Lungs: symmetric chest rise; no wheezing  CV: Good distal perfusion; normal hear rate  Extremities: No deformity  Neurologic Exam: Cranial nerves II-XII are grossly intact; no focal deficit      Procedure Note  Procedure performed: Rigid nasal endoscopy  Indication: To evaluate for sinonasal pathology not visualized on routine anterior rhinoscopy  Anesthesia: 4% topical lidocaine with 0.05% oxymetazoline  Description of procedure: A 30 degree, 3 mm rigid endoscope was inserted into bilateral nasal cavities and the nasal valves, nasal cavity, middle meatus, sphenoethmoid recess, and nasopharynx were thoroughly evaluated for evidence of obstruction, edema, purulence, polyps and/or mass/lesion.     Findings  RT: shaggy polypoid lesion of right nasal cavity, lateral nasal wall; difficult to determine of max sinus is auto-obliterated with mucosal thickening or tumor present in max sinus; appears stable when compared to prior exam  LT: nasal cavity clear; MM clear    The patient tolerated the procedure well without complication.     Laboratory Review:  n/a    Imaging Review:  Reviewed updated CT sinus obtained prior to visit on 12/4/2019: RT max sinus/nasal cavity mass c/w known inverted papilloma; there is yareli-osteogenesis of RT max sinus    Pathology Review:  9/24/2019  SPECIMEN(S):   Papiloma     FINAL DIAGNOSIS:   SINUS MASS, SITE NOT SPECIFIED, EXCISIONAL BIOPSY:   - Sinonasal  (Schneiderian) papilloma, inverted type, inflamed.   - No evidence of dysplasia or malignancy identified.     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   Chio now desires excision of her recurrent inverted papilloma.  I think this is feasible for endoscopically but may require a Carrero-Jean-Paul approach as well.  We discussed risks associated with surgery.  We will go ahead and schedule.    I discussed the risks, benefits, and alternatives to endoscopic sinonasal surgery, including but not limited to: pain, bleeding, infection, CSF leak, orbital injury resulting in vision changes and/or vision loss, septal perforation and/or hematoma, dental hypesthesia, facial numbness, need for continued medical management after surgery, and need for additional procedures, among others. She was allowed to ask questions, which I answered to the best of my ability. After this discussion, surgery was elected.  We also discussed risks specific to Carrero-Jean-Paul approach, including dental injury.     Plan:  1.  Plan for revision right image guided endoscopic excision of recurrent inverted papilloma, possible Carrero-Jean-Paul approach.   2.  Return to clinic postop    Naeem Chapin MD    [facility]  Rhinology  Endoscopic Skull Base Surgery  [facility]  Department of Otolaryngology - Head & Neck Surgery          Again, thank you for allowing me to participate in the care of your patient.      Sincerely,    Naeem Chapin MD

## 2021-01-13 ENCOUNTER — ANCILLARY PROCEDURE (OUTPATIENT)
Dept: CT IMAGING | Facility: CLINIC | Age: 51
End: 2021-01-13
Attending: OTOLARYNGOLOGY
Payer: COMMERCIAL

## 2021-01-13 ENCOUNTER — PREP FOR PROCEDURE (OUTPATIENT)
Dept: OTOLARYNGOLOGY | Facility: CLINIC | Age: 51
End: 2021-01-13

## 2021-01-13 DIAGNOSIS — D14.0 INVERTED PAPILLOMA OF MAXILLARY SINUS: Primary | ICD-10-CM

## 2021-01-13 DIAGNOSIS — R09.81 NASAL CONGESTION: ICD-10-CM

## 2021-01-13 PROCEDURE — 70486 CT MAXILLOFACIAL W/O DYE: CPT | Performed by: STUDENT IN AN ORGANIZED HEALTH CARE EDUCATION/TRAINING PROGRAM

## 2021-01-14 ENCOUNTER — TELEPHONE (OUTPATIENT)
Dept: OTOLARYNGOLOGY | Facility: CLINIC | Age: 51
End: 2021-01-14

## 2021-01-14 PROBLEM — D14.0 INVERTED PAPILLOMA OF MAXILLARY SINUS: Status: ACTIVE | Noted: 2021-01-14

## 2021-01-14 NOTE — TELEPHONE ENCOUNTER
Called patient to schedule surgery    Surgeon: Dr. 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 Patient will have these results sent in.   Pre-cert/Authorization completed:  No  Packet sent out: Received 01/14/21      Patient aware of visitor restrictions. Understands surgery timing. No further questions or concerns.

## 2021-01-14 NOTE — RESULT ENCOUNTER NOTE
Can we let Gloria know that on he most recent CT, her papilloma looks fairly stable in size, similar to what we discussed in clinic.     Jim

## 2021-01-15 ENCOUNTER — HEALTH MAINTENANCE LETTER (OUTPATIENT)
Age: 51
End: 2021-01-15

## 2021-01-19 ENCOUNTER — PATIENT OUTREACH (OUTPATIENT)
Dept: OTOLARYNGOLOGY | Facility: CLINIC | Age: 51
End: 2021-01-19

## 2021-01-19 NOTE — PROGRESS NOTES
Called patient to discuss results of recent CT scan. Writer informed patient of the following results:     CT FACIAL BONES W/O CONTRAST:     Impression: Grossly stable inverted papilloma arising from the  inferior maxillary sinus with protrusion into inferior right nasal  Passage.    Writer provided patient with direct contact information should she have any follow up questions or concerns. Writer informed patient to follow up with surgery as planned.     Tamika Perla RN

## 2021-01-30 DIAGNOSIS — Z11.59 ENCOUNTER FOR SCREENING FOR OTHER VIRAL DISEASES: ICD-10-CM

## 2021-02-08 ENCOUNTER — OFFICE VISIT (OUTPATIENT)
Dept: FAMILY MEDICINE | Facility: CLINIC | Age: 51
End: 2021-02-08
Payer: COMMERCIAL

## 2021-02-08 VITALS
WEIGHT: 191.6 LBS | BODY MASS INDEX: 31.88 KG/M2 | HEART RATE: 88 BPM | SYSTOLIC BLOOD PRESSURE: 143 MMHG | DIASTOLIC BLOOD PRESSURE: 84 MMHG | TEMPERATURE: 98.7 F | OXYGEN SATURATION: 99 %

## 2021-02-08 DIAGNOSIS — Z01.818 PREOP GENERAL PHYSICAL EXAM: Primary | ICD-10-CM

## 2021-02-08 DIAGNOSIS — D14.0 INVERTED PAPILLOMA OF MAXILLARY SINUS: ICD-10-CM

## 2021-02-08 PROBLEM — F41.1 GENERALIZED ANXIETY DISORDER: Status: ACTIVE | Noted: 2021-02-08

## 2021-02-08 PROBLEM — K21.9 GASTROESOPHAGEAL REFLUX DISEASE WITHOUT ESOPHAGITIS: Status: ACTIVE | Noted: 2021-02-08

## 2021-02-08 PROBLEM — F32.0 MILD MAJOR DEPRESSION, SINGLE EPISODE (H): Status: ACTIVE | Noted: 2021-02-08

## 2021-02-08 PROBLEM — K58.0 IRRITABLE BOWEL SYNDROME WITH DIARRHEA: Status: ACTIVE | Noted: 2021-02-08

## 2021-02-08 LAB — HGB BLD-MCNC: 10.5 G/DL (ref 11.7–15.7)

## 2021-02-08 PROCEDURE — 99213 OFFICE O/P EST LOW 20 MIN: CPT | Performed by: FAMILY MEDICINE

## 2021-02-08 PROCEDURE — 85018 HEMOGLOBIN: CPT | Performed by: FAMILY MEDICINE

## 2021-02-08 PROCEDURE — 36415 COLL VENOUS BLD VENIPUNCTURE: CPT | Performed by: FAMILY MEDICINE

## 2021-02-08 NOTE — PATIENT INSTRUCTIONS

## 2021-02-08 NOTE — PROGRESS NOTES
[facility] [address] [place] [address]  Phone: [phone]  Primary Provider: Chio Rodriguez  Pre-op Performing Provider: ZORA SALCEDO      PREOPERATIVE EVALUATION:  Today's date: 2/8/2021    Chio Bledsoe is a 50 year old female who presents for a preoperative evaluation.    Surgical Information:  Surgery/Procedure:Inverted papilloma of maxillary sinus   Surgery Location: [facility]  Surgeon: Dr Chapin  Surgery Date: 2/15/2021  Time of Surgery: 10 Am   Where patient plans to recover: At home with family  Fax number for surgical facility: Note does not need to be faxed, will be available electronically in Epic.    Type of Anesthesia Anticipated: to be determined    Assessment & Plan     The proposed surgical procedure is considered INTERMEDIATE risk.    Preop general physical exam  Inverted papilloma of maxillary sinus  - Hemoglobin      Risks and Recommendations:  The patient has the following additional risks and recommendations for perioperative complications:   - No identified additional risk factors other than previously addressed    Medication Instructions:  Patient is to take all scheduled medications on the day of surgery    RECOMMENDATION:  APPROVAL GIVEN to proceed with proposed procedure, without further diagnostic evaluation.      20 minutes spent on the date of the encounter doing chart review, history and exam, documentation and further activities as noted above        Subjective     HPI related to upcoming procedure:   Hx of maxillary papilloma. Has had papilloma regrowth since the 90s. Grows back every few years. Has had it removed 4 times. This will the 5th time she will have this surgery to remove recurrent papilloma. Interferes with breathing and sleep.     Preop Questions 2/8/2021   1. Have you ever had a heart attack or stroke? No   2. Have you ever had surgery on your heart or blood vessels, such as a stent placement, a coronary artery bypass,  or surgery on an artery in your head, neck, heart, or legs? No   3. Do you have chest pain with activity? No   4. Do you have a history of  heart failure? No   5. Do you currently have a cold, bronchitis or symptoms of other infection? No   6. Do you have a cough, shortness of breath, or wheezing? No   7. Do you or anyone in your family have previous history of blood clots? No   8. Do you or does anyone in your family have a serious bleeding problem such as prolonged bleeding following surgeries or cuts? No   9. Have you ever had problems with anemia or been told to take iron pills? No   10. Have you had any abnormal blood loss such as black, tarry or bloody stools, or abnormal vaginal bleeding? No   11. Have you ever had a blood transfusion? No   12. Are you willing to have a blood transfusion if it is medically needed before, during, or after your surgery? Yes   13. Have you or any of your relatives ever had problems with anesthesia? No   14. Do you have sleep apnea, excessive snoring or daytime drowsiness? YES - not sleep apnea.   14a. Do you have a CPAP machine? No   15. Do you have any artifical heart valves or other implanted medical devices like a pacemaker, defibrillator, or continuous glucose monitor? No   16. Do you have artificial joints? No   17. Are you allergic to latex? No   18. Is there any chance that you may be pregnant? No       Health Care Directive:  Patient does not have a Health Care Directive or Living Will: Discussed advance care planning with patient; however, patient declined at this time.    Preoperative Review of :   reviewed - no record of controlled substances prescribed.  [id number]    Review of Systems  CONSTITUTIONAL: NEGATIVE for fever, chills, change in weight  INTEGUMENTARY/SKIN: NEGATIVE for worrisome rashes, moles or lesions  EYES: NEGATIVE for vision changes or irritation  ENT/MOUTH: NEGATIVE for ear, mouth and throat problems  RESP: NEGATIVE for significant cough or SOB  CV:  NEGATIVE for chest pain, palpitations or peripheral edema  GI: NEGATIVE for nausea, abdominal pain, heartburn, or change in bowel habits  : NEGATIVE for frequency, dysuria, or hematuria  MUSCULOSKELETAL: NEGATIVE for significant arthralgias or myalgia  NEURO: NEGATIVE for weakness, dizziness or paresthesias  HEME: NEGATIVE for bleeding problems  PSYCHIATRIC: NEGATIVE for changes in mood or affect    Patient Active Problem List    Diagnosis Date Noted     Gastroesophageal reflux disease without esophagitis 02/08/2021     Priority: Medium     Generalized anxiety disorder 02/08/2021     Priority: Medium     Irritable bowel syndrome with diarrhea 02/08/2021     Priority: Medium     Mild major depression, single episode (H) 02/08/2021     Priority: Medium     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     Priority: Medium     Pap smear for cervical cancer screening 03/19/2016     Priority: Medium     4/8/11 ASCUS with negative HPV  5/14/12 NILM  3/18/2016  NILM, HPV neg  6/2020 NIL/HPV negative    Plan: routine screening, Pap/HPV next due 6/2025       Inverted papilloma of maxillary sinus 10/29/2014     Priority: Medium     Added automatically from request for surgery [id number]       CARDIOVASCULAR SCREENING; LDL GOAL LESS THAN 160 10/31/2010     Priority: Medium     SINUS POLYP 09/13/2005     Priority: Medium      Past Medical History:   Diagnosis Date     Polyp of nasal cavity      Past Surgical History:   Procedure Laterality Date     SURGICAL HISTORY OF -       sinus surgery X 3     SURGICAL HISTORY OF -       wisdom teeth extraction     SURGICAL HISTORY OF -   1/10    breaset reduction     SURGICAL HISTORY OF -   12-18-10    Right endoscopic maxillary antrostomy with removal of contents with image guidance     Current Outpatient Medications   Medication Sig Dispense Refill     levonorgest-eth estrad 91-Day (SEASONIQUE) 0.15-0.03 &0.01 MG tablet Take 1 tablet by mouth       traZODone (DESYREL) 50 MG tablet 1/2 TAB(S) ONCE AT  NIGHT ORALLY 90 DAYS       SEASONIQUE 0.15-0.03 &0.01 MG OR TABS 1 TABLET DAILY         Allergies   Allergen Reactions     [place] Nite Time Anxiety        Social History     Tobacco Use     Smoking status: Never Smoker     Smokeless tobacco: Never Used   Substance Use Topics     Alcohol use: Yes     Comment: 1-2 drinks per month       History   Drug Use No         Objective     BP (!) 143/84 (BP Location: Right arm, Patient Position: Sitting, Cuff Size: Adult Regular)   Pulse 88   Temp 98.7  F (37.1  C) (Oral)   Wt 86.9 kg (191 lb 9.6 oz)   SpO2 99%   BMI 31.88 kg/m      Physical Exam    GENERAL APPEARANCE: healthy, alert and no distress     EYES: EOMI, PERRL     HENT: ear canals and TM's normal      NECK: no adenopathy, no asymmetry, masses, or scars and thyroid normal to palpation     RESP: lungs clear to auscultation - no rales, rhonchi or wheezes     CV: regular rates and rhythm, normal S1 S2, no S3 or S4 and no murmur, click or rub     ABDOMEN:  soft, nontender, no HSM or masses and bowel sounds normal     MS: extremities normal- no gross deformities noted, no evidence of inflammation in joints, FROM in all extremities.     SKIN: no suspicious lesions or rashes     NEURO: Normal strength and tone, sensory exam grossly normal, mentation intact and speech normal     PSYCH: mentation appears normal. and affect normal/bright     LYMPHATICS: No cervical adenopathy    Diagnostics:  Labs pending at this time.  Results will be reviewed when available.   No EKG required, no history of coronary heart disease, significant arrhythmia, peripheral arterial disease or other structural heart disease.    Revised Cardiac Risk Index (RCRI):  The patient has the following serious cardiovascular risks for perioperative complications:   - No serious cardiac risks = 0 points     RCRI Interpretation: 0 points: Class I (very low risk - 0.4% complication rate)       Signed Electronically by: Scott Dukes DO  Copy of this evaluation  report is provided to requesting physician.    Preop [facility] Preop Guidelines    Revised Cardiac Risk Index

## 2021-02-10 ENCOUNTER — DOCUMENTATION ONLY (OUTPATIENT)
Dept: CARE COORDINATION | Facility: CLINIC | Age: 51
End: 2021-02-10

## 2021-02-12 ENCOUNTER — ANESTHESIA EVENT (OUTPATIENT)
Dept: SURGERY | Facility: AMBULATORY SURGERY CENTER | Age: 51
End: 2021-02-12

## 2021-02-15 ENCOUNTER — ANESTHESIA (OUTPATIENT)
Dept: SURGERY | Facility: AMBULATORY SURGERY CENTER | Age: 51
End: 2021-02-15

## 2021-02-15 ENCOUNTER — HOSPITAL ENCOUNTER (OUTPATIENT)
Facility: AMBULATORY SURGERY CENTER | Age: 51
Discharge: HOME | End: 2021-02-15
Attending: OTOLARYNGOLOGY | Admitting: OTOLARYNGOLOGY
Payer: COMMERCIAL

## 2021-02-15 VITALS
TEMPERATURE: 97 F | HEIGHT: 66 IN | DIASTOLIC BLOOD PRESSURE: 96 MMHG | BODY MASS INDEX: 30.7 KG/M2 | RESPIRATION RATE: 16 BRPM | SYSTOLIC BLOOD PRESSURE: 162 MMHG | OXYGEN SATURATION: 96 % | HEART RATE: 93 BPM | WEIGHT: 191 LBS

## 2021-02-15 DIAGNOSIS — D14.0 INVERTED PAPILLOMA OF MAXILLARY SINUS: ICD-10-CM

## 2021-02-15 LAB
HCG UR QL: NEGATIVE
INTERNAL QC OK POCT: YES

## 2021-02-15 PROCEDURE — 81025 URINE PREGNANCY TEST: CPT | Performed by: PATHOLOGY

## 2021-02-15 PROCEDURE — 31267 ENDOSCOPY MAXILLARY SINUS: CPT | Mod: RT

## 2021-02-15 PROCEDURE — 88304 TISSUE EXAM BY PATHOLOGIST: CPT | Performed by: PATHOLOGY

## 2021-02-15 RX ORDER — FENTANYL CITRATE 50 UG/ML
25-50 INJECTION, SOLUTION INTRAMUSCULAR; INTRAVENOUS
Status: DISCONTINUED | OUTPATIENT
Start: 2021-02-15 | End: 2021-02-16 | Stop reason: HOSPADM

## 2021-02-15 RX ORDER — NALOXONE HYDROCHLORIDE 0.4 MG/ML
0.4 INJECTION, SOLUTION INTRAMUSCULAR; INTRAVENOUS; SUBCUTANEOUS
Status: DISCONTINUED | OUTPATIENT
Start: 2021-02-15 | End: 2021-02-16 | Stop reason: HOSPADM

## 2021-02-15 RX ORDER — PROPOFOL 10 MG/ML
INJECTION, EMULSION INTRAVENOUS PRN
Status: DISCONTINUED | OUTPATIENT
Start: 2021-02-15 | End: 2021-02-15

## 2021-02-15 RX ORDER — NALOXONE HYDROCHLORIDE 0.4 MG/ML
0.2 INJECTION, SOLUTION INTRAMUSCULAR; INTRAVENOUS; SUBCUTANEOUS
Status: DISCONTINUED | OUTPATIENT
Start: 2021-02-15 | End: 2021-02-16 | Stop reason: HOSPADM

## 2021-02-15 RX ORDER — KETOROLAC TROMETHAMINE 30 MG/ML
30 INJECTION, SOLUTION INTRAMUSCULAR; INTRAVENOUS EVERY 6 HOURS PRN
Status: DISCONTINUED | OUTPATIENT
Start: 2021-02-15 | End: 2021-02-16 | Stop reason: HOSPADM

## 2021-02-15 RX ORDER — DIMENHYDRINATE 50 MG/ML
25 INJECTION, SOLUTION INTRAMUSCULAR; INTRAVENOUS
Status: DISCONTINUED | OUTPATIENT
Start: 2021-02-15 | End: 2021-02-16 | Stop reason: HOSPADM

## 2021-02-15 RX ORDER — OXYCODONE HYDROCHLORIDE 5 MG/1
5 TABLET ORAL EVERY 4 HOURS PRN
Status: DISCONTINUED | OUTPATIENT
Start: 2021-02-15 | End: 2021-02-16 | Stop reason: HOSPADM

## 2021-02-15 RX ORDER — CEFAZOLIN SODIUM 1 G/3ML
INJECTION, POWDER, FOR SOLUTION INTRAMUSCULAR; INTRAVENOUS PRN
Status: DISCONTINUED | OUTPATIENT
Start: 2021-02-15 | End: 2021-02-15

## 2021-02-15 RX ORDER — ACETAMINOPHEN 325 MG/1
975 TABLET ORAL ONCE
Status: COMPLETED | OUTPATIENT
Start: 2021-02-15 | End: 2021-02-15

## 2021-02-15 RX ORDER — ECHINACEA PURPUREA EXTRACT 125 MG
TABLET ORAL
Qty: 88 ML | Refills: 1 | Status: SHIPPED | OUTPATIENT
Start: 2021-02-15 | End: 2021-12-01

## 2021-02-15 RX ORDER — LABETALOL HYDROCHLORIDE 5 MG/ML
10 INJECTION, SOLUTION INTRAVENOUS ONCE
Status: COMPLETED | OUTPATIENT
Start: 2021-02-15 | End: 2021-02-15

## 2021-02-15 RX ORDER — LIDOCAINE HYDROCHLORIDE 20 MG/ML
INJECTION, SOLUTION INFILTRATION; PERINEURAL PRN
Status: DISCONTINUED | OUTPATIENT
Start: 2021-02-15 | End: 2021-02-15

## 2021-02-15 RX ORDER — FENTANYL CITRATE 50 UG/ML
INJECTION, SOLUTION INTRAMUSCULAR; INTRAVENOUS PRN
Status: DISCONTINUED | OUTPATIENT
Start: 2021-02-15 | End: 2021-02-15

## 2021-02-15 RX ORDER — LORAZEPAM 2 MG/ML
.5-1 INJECTION INTRAMUSCULAR
Status: DISCONTINUED | OUTPATIENT
Start: 2021-02-15 | End: 2021-02-16 | Stop reason: HOSPADM

## 2021-02-15 RX ORDER — OXYCODONE HYDROCHLORIDE 5 MG/1
5 TABLET ORAL EVERY 6 HOURS PRN
Qty: 12 TABLET | Refills: 0 | Status: SHIPPED | OUTPATIENT
Start: 2021-02-15 | End: 2021-02-18

## 2021-02-15 RX ORDER — ONDANSETRON 8 MG/1
8 TABLET, ORALLY DISINTEGRATING ORAL EVERY 8 HOURS PRN
Qty: 10 TABLET | Refills: 0 | Status: SHIPPED | OUTPATIENT
Start: 2021-02-15 | End: 2021-07-15

## 2021-02-15 RX ORDER — GABAPENTIN 300 MG/1
300 CAPSULE ORAL ONCE
Status: COMPLETED | OUTPATIENT
Start: 2021-02-15 | End: 2021-02-15

## 2021-02-15 RX ORDER — ONDANSETRON 4 MG/1
4 TABLET, ORALLY DISINTEGRATING ORAL EVERY 30 MIN PRN
Status: DISCONTINUED | OUTPATIENT
Start: 2021-02-15 | End: 2021-02-16 | Stop reason: HOSPADM

## 2021-02-15 RX ORDER — ONDANSETRON 2 MG/ML
4 INJECTION INTRAMUSCULAR; INTRAVENOUS EVERY 30 MIN PRN
Status: DISCONTINUED | OUTPATIENT
Start: 2021-02-15 | End: 2021-02-16 | Stop reason: HOSPADM

## 2021-02-15 RX ORDER — ONDANSETRON 2 MG/ML
INJECTION INTRAMUSCULAR; INTRAVENOUS PRN
Status: DISCONTINUED | OUTPATIENT
Start: 2021-02-15 | End: 2021-02-15

## 2021-02-15 RX ORDER — MEPERIDINE HYDROCHLORIDE 25 MG/ML
12.5 INJECTION INTRAMUSCULAR; INTRAVENOUS; SUBCUTANEOUS
Status: DISCONTINUED | OUTPATIENT
Start: 2021-02-15 | End: 2021-02-16 | Stop reason: HOSPADM

## 2021-02-15 RX ORDER — DEXAMETHASONE SODIUM PHOSPHATE 4 MG/ML
INJECTION, SOLUTION INTRA-ARTICULAR; INTRALESIONAL; INTRAMUSCULAR; INTRAVENOUS; SOFT TISSUE PRN
Status: DISCONTINUED | OUTPATIENT
Start: 2021-02-15 | End: 2021-02-15

## 2021-02-15 RX ORDER — PROPOFOL 10 MG/ML
INJECTION, EMULSION INTRAVENOUS CONTINUOUS PRN
Status: DISCONTINUED | OUTPATIENT
Start: 2021-02-15 | End: 2021-02-15

## 2021-02-15 RX ORDER — SODIUM CHLORIDE, SODIUM LACTATE, POTASSIUM CHLORIDE, CALCIUM CHLORIDE 600; 310; 30; 20 MG/100ML; MG/100ML; MG/100ML; MG/100ML
INJECTION, SOLUTION INTRAVENOUS CONTINUOUS
Status: DISCONTINUED | OUTPATIENT
Start: 2021-02-15 | End: 2021-02-16 | Stop reason: HOSPADM

## 2021-02-15 RX ORDER — ALBUTEROL SULFATE 0.83 MG/ML
2.5 SOLUTION RESPIRATORY (INHALATION) EVERY 4 HOURS PRN
Status: DISCONTINUED | OUTPATIENT
Start: 2021-02-15 | End: 2021-02-16 | Stop reason: HOSPADM

## 2021-02-15 RX ORDER — LIDOCAINE HYDROCHLORIDE AND EPINEPHRINE 10; 10 MG/ML; UG/ML
INJECTION, SOLUTION INFILTRATION; PERINEURAL PRN
Status: DISCONTINUED | OUTPATIENT
Start: 2021-02-15 | End: 2021-02-15 | Stop reason: HOSPADM

## 2021-02-15 RX ORDER — CEFAZOLIN SODIUM 2 G/50ML
2 SOLUTION INTRAVENOUS
Status: COMPLETED | OUTPATIENT
Start: 2021-02-15 | End: 2021-02-15

## 2021-02-15 RX ORDER — HYDROMORPHONE HYDROCHLORIDE 1 MG/ML
.3-.5 INJECTION, SOLUTION INTRAMUSCULAR; INTRAVENOUS; SUBCUTANEOUS EVERY 10 MIN PRN
Status: DISCONTINUED | OUTPATIENT
Start: 2021-02-15 | End: 2021-02-16 | Stop reason: HOSPADM

## 2021-02-15 RX ORDER — SODIUM CHLORIDE, SODIUM LACTATE, POTASSIUM CHLORIDE, CALCIUM CHLORIDE 600; 310; 30; 20 MG/100ML; MG/100ML; MG/100ML; MG/100ML
INJECTION, SOLUTION INTRAVENOUS CONTINUOUS PRN
Status: DISCONTINUED | OUTPATIENT
Start: 2021-02-15 | End: 2021-02-15

## 2021-02-15 RX ORDER — EPINEPHRINE 1 MG/ML
INJECTION, SOLUTION INTRAMUSCULAR; SUBCUTANEOUS PRN
Status: DISCONTINUED | OUTPATIENT
Start: 2021-02-15 | End: 2021-02-15 | Stop reason: HOSPADM

## 2021-02-15 RX ADMIN — OXYCODONE HYDROCHLORIDE 5 MG: 5 TABLET ORAL at 15:09

## 2021-02-15 RX ADMIN — PROPOFOL 150 MCG/KG/MIN: 10 INJECTION, EMULSION INTRAVENOUS at 11:50

## 2021-02-15 RX ADMIN — ONDANSETRON 4 MG: 2 INJECTION INTRAMUSCULAR; INTRAVENOUS at 12:00

## 2021-02-15 RX ADMIN — Medication 0.5 MG: at 12:52

## 2021-02-15 RX ADMIN — FENTANYL CITRATE 50 MCG: 50 INJECTION, SOLUTION INTRAMUSCULAR; INTRAVENOUS at 12:00

## 2021-02-15 RX ADMIN — CEFAZOLIN SODIUM 2 G: 2 SOLUTION INTRAVENOUS at 12:00

## 2021-02-15 RX ADMIN — GABAPENTIN 300 MG: 300 CAPSULE ORAL at 10:33

## 2021-02-15 RX ADMIN — LIDOCAINE HYDROCHLORIDE 100 MG: 20 INJECTION, SOLUTION INFILTRATION; PERINEURAL at 11:50

## 2021-02-15 RX ADMIN — PROPOFOL 200 MG: 10 INJECTION, EMULSION INTRAVENOUS at 11:50

## 2021-02-15 RX ADMIN — FENTANYL CITRATE 50 MCG: 50 INJECTION, SOLUTION INTRAMUSCULAR; INTRAVENOUS at 11:50

## 2021-02-15 RX ADMIN — Medication 40 MG: at 11:50

## 2021-02-15 RX ADMIN — LABETALOL HYDROCHLORIDE 10 MG: 5 INJECTION, SOLUTION INTRAVENOUS at 15:32

## 2021-02-15 RX ADMIN — DEXAMETHASONE SODIUM PHOSPHATE 10 MG: 4 INJECTION, SOLUTION INTRA-ARTICULAR; INTRALESIONAL; INTRAMUSCULAR; INTRAVENOUS; SOFT TISSUE at 12:00

## 2021-02-15 RX ADMIN — ACETAMINOPHEN 975 MG: 325 TABLET ORAL at 10:33

## 2021-02-15 RX ADMIN — CEFAZOLIN SODIUM 1 G: 1 INJECTION, POWDER, FOR SOLUTION INTRAMUSCULAR; INTRAVENOUS at 14:02

## 2021-02-15 RX ADMIN — Medication 10 MG: at 13:55

## 2021-02-15 RX ADMIN — LABETALOL HYDROCHLORIDE 10 MG: 5 INJECTION, SOLUTION INTRAVENOUS at 14:59

## 2021-02-15 RX ADMIN — SODIUM CHLORIDE, SODIUM LACTATE, POTASSIUM CHLORIDE, CALCIUM CHLORIDE: 600; 310; 30; 20 INJECTION, SOLUTION INTRAVENOUS at 11:43

## 2021-02-15 ASSESSMENT — MIFFLIN-ST. JEOR: SCORE: 1498.12

## 2021-02-15 NOTE — BRIEF OP NOTE
[facility] Brief Operative Note    Pre-operative diagnosis: Inverted papilloma of maxillary sinus [D14.0]   Post-operative diagnosis same   Procedure: Procedure(s):  Right image guided endoscopic maxillary antrostomy with tissue removal   Surgeon(s): Surgeon(s) and Role:     * Naeem Chapin MD - Primary   Estimated blood loss: * No values recorded between 2/15/2021 12:17 PM and 2/15/2021  2:16 PM *    Specimens: ID Type Source Tests Collected by Time Destination   A : Right Maxillary Sinus Content Tissue Sinus Contents, Maxillary, Right SURGICAL PATHOLOGY EXAM Naeem Chapin MD 2/15/2021 12:49 PM       Findings: Diffuse inverted papilloma of right maxillary sinus with attachment throughout anterior, lateral, floor, and posterior maxillary sinus wall.     Naeem Chapin MD    [facility]  Rhinology  Endoscopic Skull Base Surgery  [facility]  Department of Otolaryngology - Head & Neck Surgery

## 2021-02-15 NOTE — DISCHARGE INSTRUCTIONS
Surgical Discharge Instructions  1. Start rinsing both nasal cavities with Angel-Med Sinus rinse twice daily. This will assist with healing after surgery.   2. Take medications as prescribed.    3. You have been prescribed two medications: a narcotic pain medication (oxycodone) to take as needed for pain not controlled with tylenol and/or ibuprofen, and a medication for nausea (ondansetron, aka zofran) to use for nausea and/or vomiting.   4. Do not drive or operate machinery while taking narcotic pain medication.   5. For bleeding that is bothersome or excessive, spray afrin (oxymetazoline) nasal spray (four sprays into each nostril) and pinch the tip of the nose closed for 10 minutes. If you find you have done this four times in one hour and are still having bothersome bleeding, please call your doctor.   6. Please call your doctor for any headache not controlled with pain medication, severe nausea or vomiting, fevers >100.4, changes in mental status, or any other concerning symptoms you may identify.     Naeem Chapin MD    [facility]  Rhinology  Endoscopic Skull Base Surgery  [facility]  Department of Otolaryngology - Head & Neck Surgery      [facility]  Home Care Following Anesthesia  For 24 hours after surgery:  1. Get plenty of rest.  A responsible adult must stay with you for at least 24 hours after you leave the surgery center.  2. Do not drive or use heavy equipment.  If you have weakness or tingling, don't drive or use heavy equipment until this feeling goes away.   3. Do not drink alcohol.   4. Avoid strenuous or risky activities.  Ask for help when climbing stairs.  5. You may feel lightheaded.  IF so, sit for a few minutes before standing.  Have someone help you get up.   6. If you have nausea (feel sick to your stomach): Drink only clear liquids such as apple juice, ginger ale, broth or 7-Up.  Rest may also help.  Be  sure to drink enough fluids.  Move to a regular diet as you feel able.   7. You may have a slight fever.  Call the doctor if your fever is over 100 F (37.7 C) (taken under the tongue) or lasts longer than 24 hours.  8. You may have a dry mouth, a sore throat, muscle aches or trouble sleeping. These should go away after 24 hours.  9. Do not make important or legal decisions.   If you use hormonal birth control (such as the pill, patch, ring or implants):  You will need a second form of birth control for 7 days (condoms, a diaphragm or contraceptive foam).  While in the surgery center, you received a medicine called Sugammadex.  Hormonal birth control (such as the pill, patch, ring or implants) will not work as well for a week after taking this medicine.         Tips for taking pain medications  To get the best pain relief possible, remember these points:    Take pain medications as directed, before pain becomes severe.    Pain medication can upset your stomach: taking it with food may help.    Constipation is a common side effect of pain medication. Drink plenty of  fluids.    Eat foods high in fiber. Take a stool softener if recommended by your doctor or pharmacist.    Do not drink alcohol, drive or operate machinery while taking pain medications.    Ask about other ways to control pain, such as with heat, ice or relaxation.    Tylenol/Acetaminophen Consumption  To help encourage the safe use of acetaminophen, the makers of TYLENOL  have lowered the maximum daily dose for single-ingredient Extra Strength TYLENOL  (acetaminophen) products sold in the [place] from 8 pills per day (4,000 mg) to 6 pills per day (3,000 mg). The dosing interval has also changed from 2 pills every 4-6 hours to 2 pills every 6 hours.    If you feel your pain relief is insufficient, you may take Tylenol/Acetaminophen in addition to your narcotic pain medication.     Be careful not to exceed 3,000 mg of Tylenol/Acetaminophen in a 24 hour period  from all sources.    If you are taking extra strength Tylenol/acetaminophen (500 mg), the maximum dose is 6 tablets in 24 hours.    If you are taking regular strength acetaminophen (325 mg), the maximum dose is 9 tablets in 24 hours.  975 mg of Tylenol given at 10:30 am next dose may be given after 4:30 pm    Call a doctor for any of the followin. Signs of infection (fever, growing tenderness at the surgery site, a large amount of drainage or bleeding, severe pain, foul-smelling drainage, redness, swelling).  2. It has been over 8 to 10 hours since surgery and you are still not able to urinate (pass water).  3. Headache for over 24 hours.  4. Signs of Covid-19 infection (temperature over 100 degrees, shortness of breath, cough, loss of taste/smell, generalized body aches, persistent headache, chills, sore throat, nausea/vomiting/diarrhea)  Your doctor is:       Dr. Naeem Chapin, ENT Otolaryngology: [phone]               Or dial [phone] and ask for the resident on call for:  ENT Otolaryngology  For emergency care, call the:  [facility]:  [phone] (TTY for hearing impaired: [phone])

## 2021-02-15 NOTE — OP NOTE
Procedure Date: 02/15/2021      PREOPERATIVE DIAGNOSIS:   1.  Recurrent right maxillary sinus inverted papilloma       POSTOPERATIVE DIAGNOSIS:   1.  Recurrent right maxillary sinus inverted papilloma      PROCEDURE PERFORMED:    1. Right endoscopic maxillary antrostomy with tissue removal, revision.   2. Computerized image guidance, extradural    ATTENDING SURGEON:  Naeem Chapin MD      ASSISTANT:  None.      INDICATIONS FOR PROCEDURE:  Chio Franciscoisen is a 50-year-old woman who has had multiple recurrent right-sided maxillary sinus inverted papilloma.  She was a candidate for surgery as listed above.  We discussed all of the risks, benefits and alternatives to endoscopic sinus surgery for removal of inverted papilloma, including recurrence, dysplasia or malignancy identified within the specimen, facial numbness, orbital injury, among others.  She was allowed to ask a number of insightful questions, which I did answer to the best of my ability.  After this discussion, written informed consent was obtained.      DESCRIPTION OF PROCEDURE IN DETAIL:  The patient was identified in the preoperative holding area where consent was confirmed.  She was subsequently brought back to the operating room where general anesthesia was induced.  She was intubated without issue.  The eyes were protected.  A timeout was performed.  All were in agreement.  She was subsequently turned 180 degrees.  She was prepped and draped in standard fashion in preparation for endoscopic sinus surgery.  We performed image guidance registration using Zipments contour-based image guidance system.  We used several landmarks across the facial skeleton to perform good target registration with minimal error.  This was used throughout the case to confirm important landmarks, including the boundaries of the maxillary sinus as well as the orbit.  This was especially important in this patient, who had a high burden of tumor within the maxillary sinus  and had alterations in her anatomy as a result of tumor growth as well as prior surgery.      We started the procedure on the right side.  We injected the tumor.  There was tumor extending from the maxillary sinus into the nasal cavity.  The inferior turbinate had already been resected.  I debrided the specimen using a straight microdebrider posteriorly towards the nasopharynx.  Once we had debrided the tumor into the maxillary sinus, I noted diffuse attachments to the lateral nasal wall, floor of the maxillary sinus as well as the posterior maxillary sinus wall.  Using a combination of suction, 30-degree telescope and 70-degree telescope and image guidance, I painstakingly stripped the lining of the maxillary sinus wall where there was tumor attached.  I also used a curved RAD 40 and RAD 60 microdebrider to debulk the tumor towards the walls of the maxillary sinus.  I obtained hemostasis using suction Bovie electrocautery.  There was extensive tumor pockets within the anterior maxillary sinus as well as the floor of the maxillary sinus around the tooth roots.  I was careful not to injure any of the tooth roots.  Tumor was serially stripped off the walls of the maxillary sinus anteriorly, laterally, on the floor and the posterior maxillary sinus wall.  Again, hemostasis was obtained using suction Bovie electrocautery as well as epinephrine-soaked pledgets.  I then ensured meticulous hemostasis.  Once I was happy with gross total tumor removal, I irrigated the sinonasal cavity out.  The orogastric tube was placed in the stomach, and the contents were suctioned.  I did then do another inspection of the right maxillary sinus and I observed no remnant tumor.  Given this, I did not decide it was necessary to perform a Carrero-Jean-Paul procedure to better access the lateral extent of the maxillary sinus.  The patient was subsequently turned over to Anesthesia and was extubated in an uneventful fashion.  She was transferred to  the PACU in stable condition.  I did call the  and updated him regarding the outcome of surgery.         JAZLYN SOLIS MD             D: 02/15/2021   T: 02/15/2021   MT: DEV      Name:     SAUL CARR   MRN:      [id number]        Account:        [id number]   :      1970           Procedure Date: 02/15/2021      Document: [id number]

## 2021-02-15 NOTE — ANESTHESIA PREPROCEDURE EVALUATION
Anesthesia Pre-Procedure Evaluation    Patient: Chio Bledsoe   MRN: [id number] : 1970        Preoperative Diagnosis: Inverted papilloma of maxillary sinus [D14.0]   Procedure : Procedure(s):  Right image guided endoscopic maxillary antrostomy with tissue removal, possible Carrero Jean-Paul     Past Medical History:   Diagnosis Date     Polyp of nasal cavity       Past Surgical History:   Procedure Laterality Date     SURGICAL HISTORY OF -       sinus surgery X 3     SURGICAL HISTORY OF -       wisdom teeth extraction     SURGICAL HISTORY OF -   1/10    breaset reduction     SURGICAL HISTORY OF -   12-18-10    Right endoscopic maxillary antrostomy with removal of contents with image guidance      Allergies   Allergen Reactions     [place] Nite Time Anxiety      Social History     Tobacco Use     Smoking status: Never Smoker     Smokeless tobacco: Never Used   Substance Use Topics     Alcohol use: Yes     Comment: 1-2 drinks per month      Wt Readings from Last 1 Encounters:   02/15/21 86.6 kg (191 lb)        Anesthesia Evaluation            ROS/MED HX  ENT/Pulmonary:  - neg pulmonary ROS     Neurologic:  - neg neurologic ROS     Cardiovascular:  - neg cardiovascular ROS     METS/Exercise Tolerance:     Hematologic:  - neg hematologic  ROS     Musculoskeletal:  - neg musculoskeletal ROS     GI/Hepatic:  - neg GI/hepatic ROS   (+) GERD,     Renal/Genitourinary:  - neg Renal ROS     Endo:  - neg endo ROS     Psychiatric/Substance Use:  - neg psychiatric ROS   (+) psychiatric history anxiety and depression     Infectious Disease:  - neg infectious disease ROS     Malignancy:  - neg malignancy ROS     Other:  - neg other ROS          Physical Exam    Airway  airway exam normal      Mallampati: II   TM distance: > 3 FB   Neck ROM: full   Mouth opening: > 3 cm    Respiratory Devices and Support         Dental  no notable dental history         Cardiovascular          Rhythm and rate: regular and normal      Pulmonary   pulmonary exam normal        breath sounds clear to auscultation           OUTSIDE LABS:  CBC:   Lab Results   Component Value Date    HGB 10.5 (L) 02/08/2021    HGB 14.3 12/13/2010     BMP: No results found for: NA, POTASSIUM, CHLORIDE, CO2, BUN, CR, GLC  COAGS: No results found for: PTT, INR, FIBR  POC:   Lab Results   Component Value Date    HCG Negative 02/15/2021     HEPATIC: No results found for: ALBUMIN, PROTTOTAL, ALT, AST, GGT, ALKPHOS, BILITOTAL, BILIDIRECT, MICAH  OTHER: No results found for: PH, LACT, A1C, KELSEY, PHOS, MAG, LIPASE, AMYLASE, TSH, T4, T3, CRP, SED    Anesthesia Plan    ASA Status:  1   NPO Status:  NPO Appropriate    Anesthesia Type: General.     - Airway: ETT   Induction: Intravenous.   Maintenance: Balanced.        Consents    Anesthesia Plan(s) and associated risks, benefits, and realistic alternatives discussed. Questions answered and patient/representative(s) expressed understanding.     - Discussed with:  Patient      - Extended Intubation/Ventilatory Support Discussed: no Extended Intubation.      - Patient is DNR/DNI Status: No    Use of blood products discussed: No .     Postoperative Care    Pain management: IV analgesics, Oral pain medications, Multi-modal analgesia.   PONV prophylaxis: Ondansetron (or other 5HT-3), Dexamethasone or Solumedrol     Comments:                Arthur Verduzco MD

## 2021-02-15 NOTE — ANESTHESIA CARE TRANSFER NOTE
Patient: Chio Bledsoe    Procedure(s):  Right image guided endoscopic maxillary antrostomy with tissue removal    Diagnosis: Inverted papilloma of maxillary sinus [D14.0]  Diagnosis Additional Information: No value filed.    Anesthesia Type:   General     Note:    Oropharynx: oral airway in place  Level of Consciousness: drowsy  Oxygen Supplementation: face mask  Level of Supplemental Oxygen (L/min / FiO2): 6  Independent Airway: airway patency satisfactory and stable  Dentition: dentition unchanged  Vital Signs Stable: post-procedure vital signs reviewed and stable  Report to RN Given: handoff report given  Patient transferred to: PACU    Handoff Report: Identifed the Patient, Identified the Reponsible Provider, Reviewed the pertinent medical history, Discussed the surgical course, Reviewed Intra-OP anesthesia mangement and issues during anesthesia, Set expectations for post-procedure period and Allowed opportunity for questions and acknowledgement of understanding      Vitals: (Last set prior to Anesthesia Care Transfer)  CRNA VITALS  2/15/2021 1354 - 2/15/2021 1424      2/15/2021             Pulse:  101    SpO2:  100 %    Resp Rate (observed):  9    Resp Rate (set):  10        Electronically Signed By: ELOISA Boston CRNA  February 15, 2021  2:24 PM

## 2021-02-15 NOTE — ANESTHESIA POSTPROCEDURE EVALUATION
Patient: Chio Bledsoe    Procedure(s):  Right image guided endoscopic maxillary antrostomy with tissue removal    Diagnosis:Inverted papilloma of maxillary sinus [D14.0]  Diagnosis Additional Information: No value filed.    Anesthesia Type:  General    Note:  Disposition: Outpatient   Postop Pain Control: Uneventful            Sign Out: Well controlled pain   PONV: No   Neuro/Psych: Uneventful            Sign Out: Acceptable/Baseline neuro status   Airway/Respiratory: Uneventful            Sign Out: Acceptable/Baseline resp. status   CV/Hemodynamics: Uneventful            Sign Out: Acceptable CV status   Other NRE: NONE   DID A NON-ROUTINE EVENT OCCUR? No         Last vitals:  Vitals:    02/15/21 1530 02/15/21 1545 02/15/21 1550   BP: (!) 179/100 (!) 167/92 (!) 162/96   Pulse:      Resp: 16 16 16   Temp:   36.1  C (97  F)   SpO2: 95% 96% 96%       Last vitals prior to Anesthesia Care Transfer:  CRNA VITALS  2/15/2021 1354 - 2/15/2021 1454      2/15/2021             Pulse:  101    SpO2:  100 %    Resp Rate (observed):  9    Resp Rate (set):  10          Electronically Signed By: Arthur Verduzco MD  February 15, 2021  4:14 PM

## 2021-02-17 LAB — COPATH REPORT: NORMAL

## 2021-02-23 NOTE — PROGRESS NOTES
[facility]                   Return Patient Visit      Encounter date: February 24, 2021    Referring Provider:   Dr. Danny Gabriel MD    Chief Complaint: inverted papilloma; snoring    History of Present Illness: Chio Bledsoe is a 49 year old woman who is referred to me by Dr. Gabriel for inverted papilloma. She has undergone multiple ESS for recurrent IP of the right maxillary by Dr. Gabriel, most recently she underwent ESS with medial maxillectomy in 2010. She was lost to follow up for several years and returned to Dr. Gabriel's office with complaints of poor sleep, snoring, and she was curious about recurrence of IP.     Interval visit  Returns for follow up  POV#1  Diffuse recurrent papilloma of RT max sinus  No significant pain or drainage  Admits to bilat eye pressure and tearing, RT>:T    [facility]  Procedure Date: 02/15/2021      PREOPERATIVE DIAGNOSIS:   1.  Recurrent right maxillary sinus inverted papilloma       POSTOPERATIVE DIAGNOSIS:   1.  Recurrent right maxillary sinus inverted papilloma      PROCEDURE PERFORMED:    1. Right endoscopic maxillary antrostomy with tissue removal, revision.   2. Computerized image guidance, extradural     ATTENDING SURGEON:  Naeem Chapin MD      ASSISTANT:  None.     Review of systems: A 14-point review of systems has been conducted and was negative for any notable symptoms, except as dictated in the history of present illness.     Past Medical History:   Diagnosis Date     Polyp of nasal cavity         Past Surgical History:   Procedure Laterality Date     OPTICAL TRACKING SYSTEM ENDOSCOPIC SINUS SURGERY Right 2/15/2021    Procedure: Right image guided endoscopic maxillary antrostomy with tissue removal;  Surgeon: Naeem Chapin MD;  Location: [facility] OR     SURGICAL HISTORY OF -       sinus surgery X 3     SURGICAL HISTORY OF -       wisdom teeth extraction     SURGICAL HISTORY OF -   1/10    aurelia  reduction     SURGICAL HISTORY OF -   12-18-10    Right endoscopic maxillary antrostomy with removal of contents with image guidance        Family History   Problem Relation Age of Onset     C.A.D. Mother      Hypertension Mother      Lipids Mother      Diabetes No family hx of      Cerebrovascular Disease No family hx of      Breast Cancer No family hx of      Cancer - colorectal No family hx of         Social History     Socioeconomic History     Marital status:      Spouse name: Not on file     Number of children: Not on file     Years of education: Not on file     Highest education level: Not on file   Occupational History     Not on file   Social Needs     Financial resource strain: Not on file     Food insecurity     Worry: Not on file     Inability: Not on file     Transportation needs     Medical: Not on file     Non-medical: Not on file   Tobacco Use     Smoking status: Never Smoker     Smokeless tobacco: Never Used   Substance and Sexual Activity     Alcohol use: Yes     Comment: 1-2 drinks per month     Drug use: No     Sexual activity: Not on file   Lifestyle     Physical activity     Days per week: Not on file     Minutes per session: Not on file     Stress: Not on file   Relationships     Social connections     Talks on phone: Not on file     Gets together: Not on file     Attends Caodaism service: Not on file     Active member of club or organization: Not on file     Attends meetings of clubs or organizations: Not on file     Relationship status: Not on file     Intimate partner violence     Fear of current or ex partner: Not on file     Emotionally abused: Not on file     Physically abused: Not on file     Forced sexual activity: Not on file   Other Topics Concern     Parent/sibling w/ CABG, MI or angioplasty before 65F 55M? Not Asked   Social History Narrative     Not on file        Physical Exam:  Vital signs: There were no vitals taken for this visit.   General Appearance: No acute  distress, appropriate demeanor, conversant  Eyes: moist conjunctivae; EOMI; pupils symmetric; visual acuity grossly intact; no proptosis  Head: normocephalic; overall symmetric appearance without deformity  Face: overall symmetric without deformity; HB I-VI  Ears: Normal appearance of external ear; external meatus normal in appearance; TMs intact without perforation bilaterally;   Nose: No external deformity;   Neurologic Exam: Cranial nerves II-XII are grossly intact; no focal deficit      Procedure Note  Procedure performed: Rigid nasal endoscopy with right-sided debridement  Indication: To evaluate for sinonasal pathology not visualized on routine anterior rhinoscopy; s/p papilloma resection  Anesthesia: 4% topical lidocaine with 0.05% oxymetazoline  Description of procedure: A 30 degree, 3 mm rigid endoscope was inserted into bilateral nasal cavities and the nasal valves, nasal cavity, middle meatus, sphenoethmoid recess, and nasopharynx were thoroughly evaluated for evidence of obstruction, edema, purulence, polyps and/or mass/lesion.     I then used a combination of non-cutting forceps, straight and curved suction to clear the right surgical cavity of a large crust in the right max sinus    Findings  RT: open maxillectomy cavity w/o obvious residual papilloma    The patient tolerated the procedure well without complication.     Laboratory Review:  n/a    Imaging Review:  Reviewed updated CT sinus obtained prior to visit on 12/4/2019: RT max sinus/nasal cavity mass c/w known inverted papilloma; there is yareli-osteogenesis of RT max sinus    Pathology Review:  9/24/2019  SPECIMEN(S):   Papiloma     FINAL DIAGNOSIS:   SINUS MASS, SITE NOT SPECIFIED, EXCISIONAL BIOPSY:   - Sinonasal (Schneiderian) papilloma, inverted type, inflamed.   - No evidence of dysplasia or malignancy identified.     Assessment/Medical Decision Making:  Recurrent diffuse inverted papilloma of right lateral nasal wall/maxillary sinus s/p  revision surgery for tumor removal    Reviewed path and discussed intraoperative findings showing diffuse nature of disease and difficulty eradicating tumor when diffuse attachment occurs. We discussed options for managing recurrence, including in-office application of topical chemotherapy versus close observation with intermittent sampling to rule out malingnant conversion.       Plan:  1.  RT endo debridement today  2. Continue sinus irrigations  3. Watch eye symptoms, especially RT eye tearing, which may indicate NLD obstruction - consider maxitrol  4. RTC in 3 weeks    Naeem Chapin MD    [facility]  Rhinology  Endoscopic Skull Base Surgery  [facility]  Department of Otolaryngology - Head & Neck Surgery

## 2021-02-24 ENCOUNTER — OFFICE VISIT (OUTPATIENT)
Dept: OTOLARYNGOLOGY | Facility: CLINIC | Age: 51
End: 2021-02-24
Payer: COMMERCIAL

## 2021-02-24 VITALS
WEIGHT: 193.78 LBS | BODY MASS INDEX: 31.28 KG/M2 | SYSTOLIC BLOOD PRESSURE: 129 MMHG | TEMPERATURE: 96.9 F | OXYGEN SATURATION: 99 % | DIASTOLIC BLOOD PRESSURE: 89 MMHG | HEART RATE: 89 BPM

## 2021-02-24 DIAGNOSIS — D14.0 INVERTED PAPILLOMA OF MAXILLARY SINUS: Primary | ICD-10-CM

## 2021-02-24 PROCEDURE — 99207 PR CDG-PROCEDURE CHARGE ONLY: CPT | Performed by: OTOLARYNGOLOGY

## 2021-02-24 PROCEDURE — 31237 NSL/SINS NDSC SURG BX POLYPC: CPT | Mod: RT | Performed by: OTOLARYNGOLOGY

## 2021-02-24 ASSESSMENT — PAIN SCALES - GENERAL: PAINLEVEL: MILD PAIN (2)

## 2021-02-24 NOTE — NURSING NOTE
Chief Complaint   Patient presents with     Post-op Visit     DOS 2/15/21         Pulse 89, temperature 96.9  F (36.1  C), weight 87.9 kg (193 lb 12.6 oz), SpO2 99 %, not currently breastfeeding.    Aria Turner EMT

## 2021-02-24 NOTE — PATIENT INSTRUCTIONS
You were seen in the ENT clinic today with Dr. Chapin    Recommendations for you:    -Continue saline irrigations      We would like you to follow up in 3 weeks      Please call our clinic for any questions, concerns, and/or worsening symptoms.      Clinic #[phone]       Option 1 for scheduling.    Thank you for allowing us to be a part of your care!    Lexi MONTALVO LPN    If you need to reach me my direct line is: [phone]

## 2021-02-24 NOTE — LETTER
2/24/2021       RE: Chio Bledsoe  [address] [facility] [address]     Dear Colleague,    Thank you for referring your patient, Chio Bledsoe, to the [facility] E[facility] NOSE AND THROAT CLINIC [place] at [facility]. Please see a copy of my visit note below.        [facility]                     Return Patient Visit      Encounter date: February 24, 2021    Referring Provider:   Dr. Danny Gabriel MD    Chief Complaint: inverted papilloma; snoring    History of Present Illness: Chio Bledsoe is a 49 year old woman who is referred to me by Dr. Gabriel for inverted papilloma. She has undergone multiple ESS for recurrent IP of the right maxillary by Dr. Gabriel, most recently she underwent ESS with medial maxillectomy in 2010. She was lost to follow up for several years and returned to Dr. Gabriel's office with complaints of poor sleep, snoring, and she was curious about recurrence of IP.     Interval visit  Returns for follow up  POV#1  Diffuse recurrent papilloma of RT max sinus  No significant pain or drainage  Admits to bilat eye pressure and tearing, RT>:T    [facility]  Procedure Date: 02/15/2021      PREOPERATIVE DIAGNOSIS:   1.  Recurrent right maxillary sinus inverted papilloma       POSTOPERATIVE DIAGNOSIS:   1.  Recurrent right maxillary sinus inverted papilloma      PROCEDURE PERFORMED:    1. Right endoscopic maxillary antrostomy with tissue removal, revision.   2. Computerized image guidance, extradural     ATTENDING SURGEON:  Naeem Chapin MD      ASSISTANT:  None.     Review of systems: A 14-point review of systems has been conducted and was negative for any notable symptoms, except as dictated in the history of present illness.     Past Medical History:   Diagnosis Date     Polyp of nasal cavity         Past Surgical History:   Procedure Laterality Date     OPTICAL TRACKING SYSTEM ENDOSCOPIC  SINUS SURGERY Right 2/15/2021    Procedure: Right image guided endoscopic maxillary antrostomy with tissue removal;  Surgeon: Naeem Chapin MD;  Location: [facility] OR     SURGICAL HISTORY OF -       sinus surgery X 3     SURGICAL HISTORY OF -       wisdom teeth extraction     SURGICAL HISTORY OF -   1/10    breaset reduction     SURGICAL HISTORY OF -   12-18-10    Right endoscopic maxillary antrostomy with removal of contents with image guidance        Family History   Problem Relation Age of Onset     C.A.D. Mother      Hypertension Mother      Lipids Mother      Diabetes No family hx of      Cerebrovascular Disease No family hx of      Breast Cancer No family hx of      Cancer - colorectal No family hx of         Social History     Socioeconomic History     Marital status:      Spouse name: Not on file     Number of children: Not on file     Years of education: Not on file     Highest education level: Not on file   Occupational History     Not on file   Social Needs     Financial resource strain: Not on file     Food insecurity     Worry: Not on file     Inability: Not on file     Transportation needs     Medical: Not on file     Non-medical: Not on file   Tobacco Use     Smoking status: Never Smoker     Smokeless tobacco: Never Used   Substance and Sexual Activity     Alcohol use: Yes     Comment: 1-2 drinks per month     Drug use: No     Sexual activity: Not on file   Lifestyle     Physical activity     Days per week: Not on file     Minutes per session: Not on file     Stress: Not on file   Relationships     Social connections     Talks on phone: Not on file     Gets together: Not on file     Attends Zoroastrianism service: Not on file     Active member of club or organization: Not on file     Attends meetings of clubs or organizations: Not on file     Relationship status: Not on file     Intimate partner violence     Fear of current or ex partner: Not on file     Emotionally abused: Not on file      Physically abused: Not on file     Forced sexual activity: Not on file   Other Topics Concern     Parent/sibling w/ CABG, MI or angioplasty before 65F 55M? Not Asked   Social History Narrative     Not on file        Physical Exam:  Vital signs: There were no vitals taken for this visit.   General Appearance: No acute distress, appropriate demeanor, conversant  Eyes: moist conjunctivae; EOMI; pupils symmetric; visual acuity grossly intact; no proptosis  Head: normocephalic; overall symmetric appearance without deformity  Face: overall symmetric without deformity; HB I-VI  Ears: Normal appearance of external ear; external meatus normal in appearance; TMs intact without perforation bilaterally;   Nose: No external deformity;   Neurologic Exam: Cranial nerves II-XII are grossly intact; no focal deficit      Procedure Note  Procedure performed: Rigid nasal endoscopy with right-sided debridement  Indication: To evaluate for sinonasal pathology not visualized on routine anterior rhinoscopy; s/p papilloma resection  Anesthesia: 4% topical lidocaine with 0.05% oxymetazoline  Description of procedure: A 30 degree, 3 mm rigid endoscope was inserted into bilateral nasal cavities and the nasal valves, nasal cavity, middle meatus, sphenoethmoid recess, and nasopharynx were thoroughly evaluated for evidence of obstruction, edema, purulence, polyps and/or mass/lesion.     I then used a combination of non-cutting forceps, straight and curved suction to clear the right surgical cavity of a large crust in the right max sinus    Findings  RT: open maxillectomy cavity w/o obvious residual papilloma    The patient tolerated the procedure well without complication.     Laboratory Review:  n/a    Imaging Review:  Reviewed updated CT sinus obtained prior to visit on 12/4/2019: RT max sinus/nasal cavity mass c/w known inverted papilloma; there is yareli-osteogenesis of RT max sinus    Pathology Review:  9/24/2019  SPECIMEN(S):   Papiloma      FINAL DIAGNOSIS:   SINUS MASS, SITE NOT SPECIFIED, EXCISIONAL BIOPSY:   - Sinonasal (Schneiderian) papilloma, inverted type, inflamed.   - No evidence of dysplasia or malignancy identified.     Assessment/Medical Decision Making:  Recurrent diffuse inverted papilloma of right lateral nasal wall/maxillary sinus s/p revision surgery for tumor removal    Reviewed path and discussed intraoperative findings showing diffuse nature of disease and difficulty eradicating tumor when diffuse attachment occurs. We discussed options for managing recurrence, including in-office application of topical chemotherapy versus close observation with intermittent sampling to rule out malingnant conversion.       Plan:  1.  RT endo debridement today  2. Continue sinus irrigations  3. Watch eye symptoms, especially RT eye tearing, which may indicate NLD obstruction - consider maxitrol  4. RTC in 3 weeks    Naeem Chapin MD    [facility]  Rhinology  Endoscopic Skull Base Surgery  [facility]  Department of Otolaryngology - Head & Neck Surgery

## 2021-03-27 ENCOUNTER — HEALTH MAINTENANCE LETTER (OUTPATIENT)
Age: 51
End: 2021-03-27

## 2021-03-31 ENCOUNTER — OFFICE VISIT (OUTPATIENT)
Dept: OTOLARYNGOLOGY | Facility: CLINIC | Age: 51
End: 2021-03-31
Payer: COMMERCIAL

## 2021-03-31 VITALS — BODY MASS INDEX: 31.02 KG/M2 | HEIGHT: 66 IN | WEIGHT: 193 LBS

## 2021-03-31 DIAGNOSIS — D14.0 INVERTED PAPILLOMA OF MAXILLARY SINUS: Primary | ICD-10-CM

## 2021-03-31 DIAGNOSIS — R09.81 NASAL CONGESTION: ICD-10-CM

## 2021-03-31 DIAGNOSIS — H04.201 RIGHT EPIPHORA: ICD-10-CM

## 2021-03-31 PROCEDURE — 31231 NASAL ENDOSCOPY DX: CPT | Mod: RT | Performed by: OTOLARYNGOLOGY

## 2021-03-31 PROCEDURE — 99212 OFFICE O/P EST SF 10 MIN: CPT | Mod: 25 | Performed by: OTOLARYNGOLOGY

## 2021-03-31 ASSESSMENT — MIFFLIN-ST. JEOR: SCORE: 1507.19

## 2021-03-31 ASSESSMENT — PAIN SCALES - GENERAL: PAINLEVEL: NO PAIN (0)

## 2021-03-31 NOTE — LETTER
3/31/2021       RE: Chio Bledsoe  [address] [facility] [address]     Dear Colleague,    Thank you for referring your patient, Chio Bledsoe, to the [facility] EAR NOSE AND THROAT CLINIC [place] at [facility]. Please see a copy of my visit note below.                      [facility]                     Return Patient Visit      Encounter date: March 31, 2021    Referring Provider:   Dr. Danny Gabriel MD    Chief Complaint: inverted papilloma; snoring    History of Present Illness: Chio Bledsoe is a 49 year old woman who is referred to me by Dr. Gabriel for inverted papilloma. She has undergone multiple ESS for recurrent IP of the right maxillary by Dr. Gabriel, most recently she underwent ESS with medial maxillectomy in 2010. She was lost to follow up for several years and returned to Dr. Gabriel's office with complaints of poor sleep, snoring, and she was curious about recurrence of IP.     Interval visit  Returns for follow up  POV#2  Diffuse recurrent papilloma of RT max sinus  No significant pain or drainage  Eye pressure improved, very slight right eye tearing.  States this is not significant  No new symptoms of nasal obstruction or epistaxis    [facility]  Procedure Date: 02/15/2021      PREOPERATIVE DIAGNOSIS:   1.  Recurrent right maxillary sinus inverted papilloma       POSTOPERATIVE DIAGNOSIS:   1.  Recurrent right maxillary sinus inverted papilloma      PROCEDURE PERFORMED:    1. Right endoscopic maxillary antrostomy with tissue removal, revision.   2. Computerized image guidance, extradural     ATTENDING SURGEON:  Naeem Chapin MD      ASSISTANT:  None.     Review of systems: A 14-point review of systems has been conducted and was negative for any notable symptoms, except as dictated in the history of present illness.     Past Medical History:   Diagnosis Date     Polyp of nasal cavity          Past Surgical History:   Procedure Laterality Date     OPTICAL TRACKING SYSTEM ENDOSCOPIC SINUS SURGERY Right 2/15/2021    Procedure: Right image guided endoscopic maxillary antrostomy with tissue removal;  Surgeon: Naeem Chapin MD;  Location: [facility] OR     SURGICAL HISTORY OF -       sinus surgery X 3     SURGICAL HISTORY OF -       wisdom teeth extraction     SURGICAL HISTORY OF -   1/10    breaset reduction     SURGICAL HISTORY OF -   12-18-10    Right endoscopic maxillary antrostomy with removal of contents with image guidance        Family History   Problem Relation Age of Onset     C.A.D. Mother      Hypertension Mother      Lipids Mother      Diabetes No family hx of      Cerebrovascular Disease No family hx of      Breast Cancer No family hx of      Cancer - colorectal No family hx of         Social History     Socioeconomic History     Marital status:      Spouse name: Not on file     Number of children: Not on file     Years of education: Not on file     Highest education level: Not on file   Occupational History     Not on file   Social Needs     Financial resource strain: Not on file     Food insecurity     Worry: Not on file     Inability: Not on file     Transportation needs     Medical: Not on file     Non-medical: Not on file   Tobacco Use     Smoking status: Never Smoker     Smokeless tobacco: Never Used   Substance and Sexual Activity     Alcohol use: Yes     Comment: 1-2 drinks per month     Drug use: No     Sexual activity: Not on file   Lifestyle     Physical activity     Days per week: Not on file     Minutes per session: Not on file     Stress: Not on file   Relationships     Social connections     Talks on phone: Not on file     Gets together: Not on file     Attends Christian service: Not on file     Active member of club or organization: Not on file     Attends meetings of clubs or organizations: Not on file     Relationship status: Not on file     Intimate partner violence      Fear of current or ex partner: Not on file     Emotionally abused: Not on file     Physically abused: Not on file     Forced sexual activity: Not on file   Other Topics Concern     Parent/sibling w/ CABG, MI or angioplasty before 65F 55M? Not Asked   Social History Narrative     Not on file        Physical Exam:  Vital signs: There were no vitals taken for this visit.   General Appearance: No acute distress, appropriate demeanor, conversant  Eyes: moist conjunctivae; EOMI; pupils symmetric; visual acuity grossly intact; no proptosis  Head: normocephalic; overall symmetric appearance without deformity  Face: overall symmetric without deformity; HB I-VI  Ears: Normal appearance of external ear; external meatus normal in appearance; TMs intact without perforation bilaterally;   Nose: No external deformity;   Neurologic Exam: Cranial nerves II-XII are grossly intact; no focal deficit      Procedure Note  Procedure performed: Rigid nasal endoscopy with right-sided debridement  Indication: To evaluate for sinonasal pathology not visualized on routine anterior rhinoscopy; s/p papilloma resection  Anesthesia: 4% topical lidocaine with 0.05% oxymetazoline  Description of procedure: A 30 degree, 3 mm rigid endoscope was inserted into bilateral nasal cavities and the nasal valves, nasal cavity, middle meatus, sphenoethmoid recess, and nasopharynx were thoroughly evaluated for evidence of obstruction, edema, purulence, polyps and/or mass/lesion.       Findings  RT: open maxillectomy cavity w/o obvious residual papilloma    The patient tolerated the procedure well without complication.     Laboratory Review:  n/a    Imaging Review:  Reviewed updated CT sinus obtained prior to visit on 12/4/2019: RT max sinus/nasal cavity mass c/w known inverted papilloma; there is yareli-osteogenesis of RT max sinus    Pathology Review:  9/24/2019  SPECIMEN(S):   Papiloma     FINAL DIAGNOSIS:   SINUS MASS, SITE NOT SPECIFIED, EXCISIONAL BIOPSY:    - Sinonasal (Schneiderian) papilloma, inverted type, inflamed.   - No evidence of dysplasia or malignancy identified.     Assessment/Medical Decision Making:  Recurrent diffuse inverted papilloma of right lateral nasal wall/maxillary sinus s/p revision surgery for tumor removal    Reviewed path and discussed intraoperative findings showing diffuse nature of disease and difficulty eradicating tumor when diffuse attachment occurs. We discussed options for managing recurrence, including in-office application of topical chemotherapy versus close observation with intermittent sampling to rule out malingnant conversion.       Plan:  1.  RT endoscopy today - no evid of residual or recurrent papilloma  2. Continue sinus irrigations as needed  3.  Discussed need for continued surveillance.  Also discussed topical 5-FU application for focal areas of recurrence.  I will send this as prescription prescription which she can  and bring with her to her follow-up appointment.  We can apply this as needed at her next follow-up. 5% topical efudex will likely be the prescription.  4.  Return to clinic in 2 months    Naeem Chapin MD    [facility]  Rhinology  Endoscopic Skull Base Surgery  [facility]  Department of Otolaryngology - Head & Neck Surgery    The above documentation was completed with the aid of voice recognition dictation software. Unexpected dictation errors may occur.        Again, thank you for allowing me to participate in the care of your patient.      Sincerely,    Naeem Chapin MD

## 2021-03-31 NOTE — NURSING NOTE
"Chief Complaint   Patient presents with     RECHECK     4 week follow up         Height 1.676 m (5' 6\"), weight 87.5 kg (193 lb), not currently breastfeeding.    Aria Turner, EMT    "

## 2021-03-31 NOTE — PATIENT INSTRUCTIONS
1.  You were seen in the ENT Clinic today by Dr. Chapin.     2.  Plan is to return to clinic in 2 months.    If you have any questions or concerns after your appointment, please call the clinic.   - Clinic phone: [phone]. Press option #1 for scheduling related needs. Press option #3 for Nurse advice.      Fabiola Cruz RN, BSN  [phone]  [facility]  Department of Otolaryngology

## 2021-05-24 ENCOUNTER — DOCUMENTATION ONLY (OUTPATIENT)
Dept: OTOLARYNGOLOGY | Facility: CLINIC | Age: 51
End: 2021-05-24

## 2021-05-24 NOTE — PROGRESS NOTES
Contacted provider via message regarding follow up with patient in clinic on Wednesday and the request for her to have %5 efudex for the appointment. Provider placing order to patient's pharmacy.    Called patient and followed up with her regarding medication. Patient advised that medication will be there for her to  and she should pick it up prior to coming in for her appointment on Wednesday.    Patient verbalizes understanding of this plan and will call us if she has any difficulties picking up the prescription.    Marilyn Angeles LPN  Department of Otolaryngology

## 2021-05-25 DIAGNOSIS — D14.0 INVERTED PAPILLOMA OF MAXILLARY SINUS: Primary | ICD-10-CM

## 2021-05-25 RX ORDER — FLUOROURACIL 50 MG/G
CREAM TOPICAL
Qty: 40 G | Refills: 0 | Status: SHIPPED | OUTPATIENT
Start: 2021-05-25

## 2021-05-26 ENCOUNTER — OFFICE VISIT (OUTPATIENT)
Dept: OTOLARYNGOLOGY | Facility: CLINIC | Age: 51
End: 2021-05-26
Payer: COMMERCIAL

## 2021-05-26 VITALS — OXYGEN SATURATION: 100 % | HEIGHT: 66 IN | TEMPERATURE: 93 F | WEIGHT: 193 LBS | BODY MASS INDEX: 31.02 KG/M2

## 2021-05-26 DIAGNOSIS — D14.0 INVERTED PAPILLOMA OF MAXILLARY SINUS: Primary | ICD-10-CM

## 2021-05-26 DIAGNOSIS — G47.30 SLEEP-DISORDERED BREATHING: ICD-10-CM

## 2021-05-26 PROCEDURE — 99212 OFFICE O/P EST SF 10 MIN: CPT | Mod: 25 | Performed by: OTOLARYNGOLOGY

## 2021-05-26 PROCEDURE — 31231 NASAL ENDOSCOPY DX: CPT | Performed by: OTOLARYNGOLOGY

## 2021-05-26 RX ORDER — TRAZODONE HYDROCHLORIDE 50 MG/1
50 TABLET, FILM COATED ORAL AT BEDTIME
Qty: 30 TABLET | Refills: 0 | Status: SHIPPED | OUTPATIENT
Start: 2021-05-26 | End: 2021-09-02

## 2021-05-26 ASSESSMENT — MIFFLIN-ST. JEOR: SCORE: 1507.19

## 2021-05-26 ASSESSMENT — PAIN SCALES - GENERAL: PAINLEVEL: NO PAIN (0)

## 2021-05-26 NOTE — NURSING NOTE
"Chief Complaint   Patient presents with     RECHECK     2 month follow up         Temperature 93  F (33.9  C), height 1.676 m (5' 6\"), weight 87.5 kg (193 lb), SpO2 100 %, not currently breastfeeding.    Aria Turner, EMT    "

## 2021-05-26 NOTE — PATIENT INSTRUCTIONS
1. Follow up in 6 weeks for repeat sinus check  2. Bring effudex prescription for application at your next follow up    Naeem Chapin MD    [facility]  Rhinology  Endoscopic Skull Base Surgery  [facility]  Department of Otolaryngology - Head & Neck Surgery

## 2021-05-26 NOTE — PROGRESS NOTES
[facility]                   Return Patient Visit      Encounter date: May 26, 2021    Referring Provider:   Dr. Danny Gabriel MD    Chief Complaint: inverted papilloma; snoring    History of Present Illness: Chio Bledose is a 49 year old woman who is referred to me by Dr. Gabriel for inverted papilloma. She has undergone multiple ESS for recurrent IP of the right maxillary by Dr. Gabriel, most recently she underwent ESS with medial maxillectomy in 2010. She was lost to follow up for several years and returned to Dr. Gabriel's office with complaints of poor sleep, snoring, and she was curious about recurrence of IP.     Interval visit  Returns for follow up  Doing well  No new symptoms  [facility]  Procedure Date: 02/15/2021      PREOPERATIVE DIAGNOSIS:   1.  Recurrent right maxillary sinus inverted papilloma       POSTOPERATIVE DIAGNOSIS:   1.  Recurrent right maxillary sinus inverted papilloma      PROCEDURE PERFORMED:    1. Right endoscopic maxillary antrostomy with tissue removal, revision.   2. Computerized image guidance, extradural     ATTENDING SURGEON:  Naeem Chapin MD      ASSISTANT:  None.     Review of systems: A 14-point review of systems has been conducted and was negative for any notable symptoms, except as dictated in the history of present illness.     Past Medical History:   Diagnosis Date     Polyp of nasal cavity         Past Surgical History:   Procedure Laterality Date     OPTICAL TRACKING SYSTEM ENDOSCOPIC SINUS SURGERY Right 2/15/2021    Procedure: Right image guided endoscopic maxillary antrostomy with tissue removal;  Surgeon: Naeem Chapin MD;  Location: [facility] OR     SURGICAL HISTORY OF -       sinus surgery X 3     SURGICAL HISTORY OF -       wisdom teeth extraction     SURGICAL HISTORY OF -   1/10    breaset reduction     SURGICAL HISTORY OF -   12-18-10    Right endoscopic maxillary antrostomy with removal  "of contents with image guidance        Family History   Problem Relation Age of Onset     C.A.D. Mother      Hypertension Mother      Lipids Mother      Diabetes No family hx of      Cerebrovascular Disease No family hx of      Breast Cancer No family hx of      Cancer - colorectal No family hx of         Social History     Socioeconomic History     Marital status:      Spouse name: Not on file     Number of children: Not on file     Years of education: Not on file     Highest education level: Not on file   Occupational History     Not on file   Social Needs     Financial resource strain: Not on file     Food insecurity     Worry: Not on file     Inability: Not on file     Transportation needs     Medical: Not on file     Non-medical: Not on file   Tobacco Use     Smoking status: Never Smoker     Smokeless tobacco: Never Used   Substance and Sexual Activity     Alcohol use: Yes     Comment: 1-2 drinks per month     Drug use: No     Sexual activity: Not on file   Lifestyle     Physical activity     Days per week: Not on file     Minutes per session: Not on file     Stress: Not on file   Relationships     Social connections     Talks on phone: Not on file     Gets together: Not on file     Attends Denominational service: Not on file     Active member of club or organization: Not on file     Attends meetings of clubs or organizations: Not on file     Relationship status: Not on file     Intimate partner violence     Fear of current or ex partner: Not on file     Emotionally abused: Not on file     Physically abused: Not on file     Forced sexual activity: Not on file   Other Topics Concern     Parent/sibling w/ CABG, MI or angioplasty before 65F 55M? Not Asked   Social History Narrative     Not on file        Physical Exam:  Vital signs: Temp 93  F (33.9  C)   Ht 1.676 m (5' 6\")   Wt 87.5 kg (193 lb)   SpO2 100%   BMI 31.15 kg/m     General Appearance: No acute distress, appropriate demeanor, conversant  Eyes: " moist conjunctivae; EOMI; pupils symmetric; visual acuity grossly intact; no proptosis  Head: normocephalic; overall symmetric appearance without deformity  Face: overall symmetric without deformity; HB I-VI  Ears: Normal appearance of external ear; external meatus normal in appearance; TMs intact without perforation bilaterally;   Nose: No external deformity;   Neurologic Exam: Cranial nerves II-XII are grossly intact; no focal deficit      Procedure Note  Procedure performed: Rigid nasal endoscopy   Indication: To evaluate for sinonasal pathology not visualized on routine anterior rhinoscopy; s/p papilloma resection  Anesthesia: 4% topical lidocaine with 0.05% oxymetazoline  Description of procedure: A 70 degree, 3 mm rigid endoscope was inserted into bilateral nasal cavities and the nasal valves, nasal cavity, middle meatus, sphenoethmoid recess, and nasopharynx were thoroughly evaluated for evidence of obstruction, edema, purulence, polyps and/or mass/lesion.       Findings  RT: open maxillectomy cavity looks very good  Small amount of papillomatous growth treated with topical 5-Follow-up - see video    The patient tolerated the procedure well without complication.     Laboratory Review:  n/a    Imaging Review:  Reviewed updated CT sinus obtained prior to visit on 12/4/2019: RT max sinus/nasal cavity mass c/w known inverted papilloma; there is yareli-osteogenesis of RT max sinus    Pathology Review:  9/24/2019  SPECIMEN(S):   Papiloma     FINAL DIAGNOSIS:   SINUS MASS, SITE NOT SPECIFIED, EXCISIONAL BIOPSY:   - Sinonasal (Schneiderian) papilloma, inverted type, inflamed.   - No evidence of dysplasia or malignancy identified.     Assessment/Medical Decision Making:  Recurrent diffuse inverted papilloma of right lateral nasal wall/maxillary sinus s/p revision surgery for tumor removal        Plan:  1.  RT endoscopy today - topical application of 5-Follow-up  2. RTC in 6 weeks for repeat check    Naeem Chapin,  MD    [facility]  Rhinology  Endoscopic Skull Base Surgery  [facility]  Department of Otolaryngology - Head & Neck Surgery    The above documentation was completed with the aid of voice recognition dictation software. Unexpected dictation errors may occur.

## 2021-05-26 NOTE — LETTER
5/26/2021       RE: Chio Bledsoe  1987 [facility] [id number]     Dear Colleague,    Thank you for referring your patient, Chio Bledsoe, to the [facility] [place] [facility]. Please see a copy of my visit note below.                       [facility]                   Return Patient Visit      Encounter date: May 26, 2021    Referring Provider:   Dr. Danny Gabriel MD    Chief Complaint: inverted papilloma; snoring    History of Present Illness: Chio Bledsoe is a 49 year old woman who is referred to me by Dr. Gabriel for inverted papilloma. She has undergone multiple ESS for recurrent IP of the right maxillary by Dr. Gabriel, most recently she underwent ESS with medial maxillectomy in 2010. She was lost to follow up for several years and returned to Dr. Gabriel's office with complaints of poor sleep, snoring, and she was curious about recurrence of IP.     Interval visit  Returns for follow up  Doing well  No new symptoms  Brought [facility] Operative History  Procedure Date: 02/15/2021      PREOPERATIVE DIAGNOSIS:   1.  Recurrent right maxillary sinus inverted papilloma       POSTOPERATIVE DIAGNOSIS:   1.  Recurrent right maxillary sinus inverted papilloma      PROCEDURE PERFORMED:    1. Right endoscopic maxillary antrostomy with tissue removal, revision.   2. Computerized image guidance, extradural     ATTENDING SURGEON:  Naeem Chapin MD      ASSISTANT:  None.     Review of systems: A 14-point review of systems has been conducted and was negative for any notable symptoms, except as dictated in the history of present illness.     Past Medical History:   Diagnosis Date     Polyp of nasal cavity         Past Surgical History:   Procedure Laterality Date     OPTICAL TRACKING SYSTEM ENDOSCOPIC SINUS SURGERY Right 2/15/2021    Procedure: Right image guided endoscopic maxillary  antrostomy with tissue removal;  Surgeon: Naeem Chapin MD;  Location: [facility] OR     SURGICAL HISTORY OF -       sinus surgery X 3     SURGICAL HISTORY OF -       wisdom teeth extraction     SURGICAL HISTORY OF -   1/10    breaset reduction     SURGICAL HISTORY OF -   12-18-10    Right endoscopic maxillary antrostomy with removal of contents with image guidance        Family History   Problem Relation Age of Onset     C.A.D. Mother      Hypertension Mother      Lipids Mother      Diabetes No family hx of      Cerebrovascular Disease No family hx of      Breast Cancer No family hx of      Cancer - colorectal No family hx of         Social History     Socioeconomic History     Marital status:      Spouse name: Not on file     Number of children: Not on file     Years of education: Not on file     Highest education level: Not on file   Occupational History     Not on file   Social Needs     Financial resource strain: Not on file     Food insecurity     Worry: Not on file     Inability: Not on file     Transportation needs     Medical: Not on file     Non-medical: Not on file   Tobacco Use     Smoking status: Never Smoker     Smokeless tobacco: Never Used   Substance and Sexual Activity     Alcohol use: Yes     Comment: 1-2 drinks per month     Drug use: No     Sexual activity: Not on file   Lifestyle     Physical activity     Days per week: Not on file     Minutes per session: Not on file     Stress: Not on file   Relationships     Social connections     Talks on phone: Not on file     Gets together: Not on file     Attends Orthodox service: Not on file     Active member of club or organization: Not on file     Attends meetings of clubs or organizations: Not on file     Relationship status: Not on file     Intimate partner violence     Fear of current or ex partner: Not on file     Emotionally abused: Not on file     Physically abused: Not on file     Forced sexual activity: Not on file   Other Topics Concern  "    Parent/sibling w/ CABG, MI or angioplasty before 65F 55M? Not Asked   Social History Narrative     Not on file        Physical Exam:  Vital signs: Temp 93  F (33.9  C)   Ht 1.676 m (5' 6\")   Wt 87.5 kg (193 lb)   SpO2 100%   BMI 31.15 kg/m     General Appearance: No acute distress, appropriate demeanor, conversant  Eyes: moist conjunctivae; EOMI; pupils symmetric; visual acuity grossly intact; no proptosis  Head: normocephalic; overall symmetric appearance without deformity  Face: overall symmetric without deformity; HB I-VI  Ears: Normal appearance of external ear; external meatus normal in appearance; TMs intact without perforation bilaterally;   Nose: No external deformity;   Neurologic Exam: Cranial nerves II-XII are grossly intact; no focal deficit      Procedure Note  Procedure performed: Rigid nasal endoscopy   Indication: To evaluate for sinonasal pathology not visualized on routine anterior rhinoscopy; s/p papilloma resection  Anesthesia: 4% topical lidocaine with 0.05% oxymetazoline  Description of procedure: A 70 degree, 3 mm rigid endoscope was inserted into bilateral nasal cavities and the nasal valves, nasal cavity, middle meatus, sphenoethmoid recess, and nasopharynx were thoroughly evaluated for evidence of obstruction, edema, purulence, polyps and/or mass/lesion.       Findings  RT: open maxillectomy cavity looks very good  Small amount of papillomatous growth treated with topical 5-Follow-up - see video    The patient tolerated the procedure well without complication.     Laboratory Review:  n/a    Imaging Review:  Reviewed updated CT sinus obtained prior to visit on 12/4/2019: RT max sinus/nasal cavity mass c/w known inverted papilloma; there is yareli-osteogenesis of RT max sinus    Pathology Review:  9/24/2019  SPECIMEN(S):   Papiloma     FINAL DIAGNOSIS:   SINUS MASS, SITE NOT SPECIFIED, EXCISIONAL BIOPSY:   - Sinonasal (Schneiderian) papilloma, inverted type, inflamed.   - No evidence of " dysplasia or malignancy identified.     Assessment/Medical Decision Making:  Recurrent diffuse inverted papilloma of right lateral nasal wall/maxillary sinus s/p revision surgery for tumor removal        Plan:  1.  RT endoscopy today - topical application of 5-Follow-up  2. RTC in 6 weeks for repeat check    Naeem Chapin MD    [facility]  Rhinology  Endoscopic Skull Base Surgery  [facility]  Department of Otolaryngology - Head & Neck Surgery    The above documentation was completed with the aid of voice recognition dictation software. Unexpected dictation errors may occur.        Again, thank you for allowing me to participate in the care of your patient.      Sincerely,    Naeem Chapin MD

## 2021-05-26 NOTE — LETTER
5/26/2021      RE: Chio Bledsoe  1987 [facility] [id number]                            [facility]                     Return Patient Visit      Encounter date: May 26, 2021    Referring Provider:   Dr. Danny Gabriel MD    Chief Complaint: inverted papilloma; snoring    History of Present Illness: Chio Bledsoe is a 49 year old woman who is referred to me by Dr. Gabriel for inverted papilloma. She has undergone multiple ESS for recurrent IP of the right maxillary by Dr. Gabriel, most recently she underwent ESS with medial maxillectomy in 2010. She was lost to follow up for several years and returned to Dr. Gabriel's office with complaints of poor sleep, snoring, and she was curious about recurrence of IP.     Interval visit  Returns for follow up  Doing well  No new symptoms  Brought effudex    [facility]  Procedure Date: 02/15/2021      PREOPERATIVE DIAGNOSIS:   1.  Recurrent right maxillary sinus inverted papilloma       POSTOPERATIVE DIAGNOSIS:   1.  Recurrent right maxillary sinus inverted papilloma      PROCEDURE PERFORMED:    1. Right endoscopic maxillary antrostomy with tissue removal, revision.   2. Computerized image guidance, extradural     ATTENDING SURGEON:  Naeem Chapin MD      ASSISTANT:  None.     Review of systems: A 14-point review of systems has been conducted and was negative for any notable symptoms, except as dictated in the history of present illness.     Past Medical History:   Diagnosis Date     Polyp of nasal cavity         Past Surgical History:   Procedure Laterality Date     OPTICAL TRACKING SYSTEM ENDOSCOPIC SINUS SURGERY Right 2/15/2021    Procedure: Right image guided endoscopic maxillary antrostomy with tissue removal;  Surgeon: Naeem Chapin MD;  Location: [facility] OR     SURGICAL HISTORY OF -       sinus surgery X 3     SURGICAL HISTORY OF -       wisdom teeth extraction     SURGICAL HISTORY OF -   1/10    breaset reduction  "    SURGICAL HISTORY OF -   12-18-10    Right endoscopic maxillary antrostomy with removal of contents with image guidance        Family History   Problem Relation Age of Onset     C.A.D. Mother      Hypertension Mother      Lipids Mother      Diabetes No family hx of      Cerebrovascular Disease No family hx of      Breast Cancer No family hx of      Cancer - colorectal No family hx of         Social History     Socioeconomic History     Marital status:      Spouse name: Not on file     Number of children: Not on file     Years of education: Not on file     Highest education level: Not on file   Occupational History     Not on file   Social Needs     Financial resource strain: Not on file     Food insecurity     Worry: Not on file     Inability: Not on file     Transportation needs     Medical: Not on file     Non-medical: Not on file   Tobacco Use     Smoking status: Never Smoker     Smokeless tobacco: Never Used   Substance and Sexual Activity     Alcohol use: Yes     Comment: 1-2 drinks per month     Drug use: No     Sexual activity: Not on file   Lifestyle     Physical activity     Days per week: Not on file     Minutes per session: Not on file     Stress: Not on file   Relationships     Social connections     Talks on phone: Not on file     Gets together: Not on file     Attends Jain service: Not on file     Active member of club or organization: Not on file     Attends meetings of clubs or organizations: Not on file     Relationship status: Not on file     Intimate partner violence     Fear of current or ex partner: Not on file     Emotionally abused: Not on file     Physically abused: Not on file     Forced sexual activity: Not on file   Other Topics Concern     Parent/sibling w/ CABG, MI or angioplasty before 65F 55M? Not Asked   Social History Narrative     Not on file        Physical Exam:  Vital signs: Temp 93  F (33.9  C)   Ht 1.676 m (5' 6\")   Wt 87.5 kg (193 lb)   SpO2 100%   BMI 31.15 " kg/m     General Appearance: No acute distress, appropriate demeanor, conversant  Eyes: moist conjunctivae; EOMI; pupils symmetric; visual acuity grossly intact; no proptosis  Head: normocephalic; overall symmetric appearance without deformity  Face: overall symmetric without deformity; HB I-VI  Ears: Normal appearance of external ear; external meatus normal in appearance; TMs intact without perforation bilaterally;   Nose: No external deformity;   Neurologic Exam: Cranial nerves II-XII are grossly intact; no focal deficit      Procedure Note  Procedure performed: Rigid nasal endoscopy   Indication: To evaluate for sinonasal pathology not visualized on routine anterior rhinoscopy; s/p papilloma resection  Anesthesia: 4% topical lidocaine with 0.05% oxymetazoline  Description of procedure: A 70 degree, 3 mm rigid endoscope was inserted into bilateral nasal cavities and the nasal valves, nasal cavity, middle meatus, sphenoethmoid recess, and nasopharynx were thoroughly evaluated for evidence of obstruction, edema, purulence, polyps and/or mass/lesion.       Findings  RT: open maxillectomy cavity looks very good  Small amount of papillomatous growth treated with topical 5-Follow-up - see video    The patient tolerated the procedure well without complication.     Laboratory Review:  n/a    Imaging Review:  Reviewed updated CT sinus obtained prior to visit on 12/4/2019: RT max sinus/nasal cavity mass c/w known inverted papilloma; there is yareli-osteogenesis of RT max sinus    Pathology Review:  9/24/2019  SPECIMEN(S):   Papiloma     FINAL DIAGNOSIS:   SINUS MASS, SITE NOT SPECIFIED, EXCISIONAL BIOPSY:   - Sinonasal (Schneiderian) papilloma, inverted type, inflamed.   - No evidence of dysplasia or malignancy identified.     Assessment/Medical Decision Making:  Recurrent diffuse inverted papilloma of right lateral nasal wall/maxillary sinus s/p revision surgery for tumor removal        Plan:  1.  RT endoscopy today - topical  application of 5-Follow-up  2. RTC in 6 weeks for repeat check    Naeem Chapin MD    [facility]  Rhinology  Endoscopic Skull Base Surgery  [facility]  Department of Otolaryngology - Head & Neck Surgery    The above documentation was completed with the aid of voice recognition dictation software. Unexpected dictation errors may occur.        Naeem Chapin MD

## 2021-07-13 ENCOUNTER — PATIENT OUTREACH (OUTPATIENT)
Dept: OTOLARYNGOLOGY | Facility: CLINIC | Age: 51
End: 2021-07-13

## 2021-07-13 NOTE — PROGRESS NOTES
Writer called patient to remind her to bring Efudex medication to appointment 7/4/21.     Left detailed message.     Ellie Javier RN on 7/13/2021 at 12:39 PM

## 2021-07-14 ENCOUNTER — OFFICE VISIT (OUTPATIENT)
Dept: OTOLARYNGOLOGY | Facility: CLINIC | Age: 51
End: 2021-07-14
Payer: COMMERCIAL

## 2021-07-14 VITALS — BODY MASS INDEX: 32.49 KG/M2 | HEIGHT: 65 IN | WEIGHT: 195 LBS

## 2021-07-14 DIAGNOSIS — J32.0 CHRONIC MAXILLARY SINUSITIS: ICD-10-CM

## 2021-07-14 DIAGNOSIS — D14.0 INVERTED PAPILLOMA OF MAXILLARY SINUS: Primary | ICD-10-CM

## 2021-07-14 DIAGNOSIS — R06.83 SNORING: ICD-10-CM

## 2021-07-14 PROCEDURE — 31231 NASAL ENDOSCOPY DX: CPT | Performed by: OTOLARYNGOLOGY

## 2021-07-14 PROCEDURE — 99213 OFFICE O/P EST LOW 20 MIN: CPT | Mod: 25 | Performed by: OTOLARYNGOLOGY

## 2021-07-14 ASSESSMENT — PAIN SCALES - GENERAL: PAINLEVEL: NO PAIN (0)

## 2021-07-14 ASSESSMENT — MIFFLIN-ST. JEOR: SCORE: 1500.39

## 2021-07-14 NOTE — PROGRESS NOTES
"  [facility]  Return Visit  Encounter date:   July 14, 2021    Chief Complaint:   Recurrent inverted papilloma; follow up     ID: Recurrent diffuse inverted papilloma of right lateral nasal wall/maxillary sinus s/p revision surgery for tumor removal.     Interval History:   Chio Bledsoe is a 50 year old female presenting for evaluation of RT max papilloma.  She denies any new symptoms or pain within her nose at this time.     Patient reports continued snoring issues, but is within normal limits of sleep apnea parameters.     [facility]  Procedure Date: 02/15/2021      PREOPERATIVE DIAGNOSIS:   1.  Recurrent right maxillary sinus inverted papilloma       POSTOPERATIVE DIAGNOSIS:   1.  Recurrent right maxillary sinus inverted papilloma     PROCEDURE PERFORMED:    1. Right endoscopic maxillary antrostomy with tissue removal, revision.   2. Computerized image guidance, extradural     ATTENDING SURGEON:  Naeem Chapin MD      ASSISTANT:  None.     Review of systems: A 14-point review of systems has been conducted and is negative for any notable symptoms, except as dictated in the history of present illness.     Physical Exam:  Vital signs: Ht 1.651 m (5' 5\")   Wt 88.5 kg (195 lb)   BMI 32.45 kg/m     General Appearance: No acute distress, appropriate demeanor, conversant  Eyes: moist conjunctivae; EOMI; pupils symmetric; visual acuity grossly intact; no proptosis  Head: normocephalic; overall symmetric appearance without deformity  Face: overall symmetric without deformity; HB I/VI  Ears: Normal appearance of external ear;  Nose: No external deformity; see nasal endoscopy    Procedure Note  Procedure performed: Rigid nasal endoscopy  Indication: To evaluate for sinonasal pathology not visualized on routine anterior rhinoscopy  Anesthesia: 4% topical lidocaine with 0.05 % oxymetazoline  Description of procedure: A 30 degree, 3 mm rigid endoscope was inserted into bilateral nasal " cavities and the nasal valves, nasal cavity, middle meatus, sphenoethmoid recess, nasopharynx were evaluated for evidence of obstruction, edema, purulence, polyps and/or mass/lesion.     Findings  RT: Post surgical changes with scar contracture of maxillary sinus. Small focus of papilloma at the anterior maxillectomy site, treated with 5-fluorouracil cream.   LT: Clear.     The patient tolerated the procedure well without complication.     Laboratory Review:  n/a    Imaging Review:  n/a    Pathology Review:  SPECIMEN(S):   Sinus contents, right maxillary     FINAL DIAGNOSIS:   Sinus, right maxillary, contents:   - Inverted squamous (Schneiderian) papilloma     Assessment/Medical Decision Making:  S/p revision RT endo medial maxillectomy for resection of recurrent diffuse, multifocal IP of RT max sinus      Plan:   Patient with 1 potential papilloma, but was unable to extract due to location; treated topically with 5fu. Otherwise well improved appearance and dissipation of former papillomas. Plan to follow in 6 weeks.     Naeem Chapin MD    [facility]  Rhinology, Endoscopic Skull Base Surgery  [facility]  Department of Otolaryngology - Head & Neck Surgery    Scribe Disclosure:  I, Al Carrero, am serving as a scribe to document services personally performed by Naeem Chapin MD at this visit, based upon the provider's statements to me. All documentation has been reviewed by the aforementioned provider prior to being entered into the official medical record.     Additional portions of the patient's have been reviewed below.   ~~~~~~~~~~~~~~~~~~~~~~~~~~~~~~~~~~~~~~~~~~~~~~~~~~~~~~~~~~~~~~~~~~~~~~~~~~~~~~~~~~~~~~~~~~~~~~~~~~~~~~~~~~~~~~~~~~~~~~~~~~~~~~~~~~~~~~~    Past Medical History:   Diagnosis Date     Polyp of nasal cavity         Past Surgical History:   Procedure Laterality Date     OPTICAL TRACKING SYSTEM ENDOSCOPIC SINUS SURGERY Right 2/15/2021     Procedure: Right image guided endoscopic maxillary antrostomy with tissue removal;  Surgeon: Naeem Chapin MD;  Location: [facility] OR     SURGICAL HISTORY OF -       sinus surgery X 3     SURGICAL HISTORY OF -       wisdom teeth extraction     SURGICAL HISTORY OF -   1/10    breaset reduction     SURGICAL HISTORY OF -   12-18-10    Right endoscopic maxillary antrostomy with removal of contents with image guidance        Family History   Problem Relation Age of Onset     C.A.D. Mother      Hypertension Mother      Lipids Mother      Diabetes No family hx of      Cerebrovascular Disease No family hx of      Breast Cancer No family hx of      Cancer - colorectal No family hx of         Social History     Socioeconomic History     Marital status:      Spouse name: Not on file     Number of children: Not on file     Years of education: Not on file     Highest education level: Not on file   Occupational History     Not on file   Tobacco Use     Smoking status: Never Smoker     Smokeless tobacco: Never Used   Substance and Sexual Activity     Alcohol use: Yes     Comment: 1-2 drinks per month     Drug use: No     Sexual activity: Not on file   Other Topics Concern     Parent/sibling w/ CABG, MI or angioplasty before 65F 55M? Not Asked   Social History Narrative     Not on file     Social Determinants of Health     Financial Resource Strain:      Difficulty of Paying Living Expenses:    Food Insecurity:      Worried About Running Out of Food in the Last Year:      Ran Out of Food in the Last Year:    Transportation Needs:      Lack of Transportation (Medical):      Lack of Transportation (Non-Medical):    Physical Activity:      Days of Exercise per Week:      Minutes of Exercise per Session:    Stress:      Feeling of Stress :    Social Connections:      Frequency of Communication with Friends and Family:      Frequency of Social Gatherings with Friends and Family:      Attends Sikh Services:      Active Member  of Clubs or Organizations:      Attends Club or Organization Meetings:      Marital Status:    Intimate Partner Violence:      Fear of Current or Ex-Partner:      Emotionally Abused:      Physically Abused:      Sexually Abused:          Scribe Disclosure:  I, Al Carrero, am serving as a scribe to document services personally performed by Naeem Chapin MD at this visit, based upon the provider's statements to me. All documentation has been reviewed by the aforementioned provider prior to being entered into the official medical record.

## 2021-07-14 NOTE — NURSING NOTE
"Chief Complaint   Patient presents with     RECHECK     6 week follow up       Height 1.651 m (5' 5\"), weight 88.5 kg (195 lb), not currently breastfeeding.    Miriam Acosta, EMT  "

## 2021-07-14 NOTE — LETTER
"7/14/2021       RE: Chio Bledsoe  [address] [facility] [id number]     Dear Colleague,    Thank you for referring your patient, Chio Bledsoe, to the [facility] [place] [facility]. Please see a copy of my visit note below.      [facility]  Return Visit  Encounter date:   July 14, 2021    Chief Complaint:   Recurrent inverted papilloma; follow up     ID: Recurrent diffuse inverted papilloma of right lateral nasal wall/maxillary sinus s/p revision surgery for tumor removal.     Interval History:   Chio Bledsoe is a 50 year old female presenting for evaluation of RT max papilloma.  She denies any new symptoms or pain within her nose at this time.     Patient reports continued snoring issues, but is within normal limits of sleep apnea parameters.     [place] [facility]  Procedure Date: 02/15/2021      PREOPERATIVE DIAGNOSIS:   1.  Recurrent right maxillary sinus inverted papilloma       POSTOPERATIVE DIAGNOSIS:   1.  Recurrent right maxillary sinus inverted papilloma     PROCEDURE PERFORMED:    1. Right endoscopic maxillary antrostomy with tissue removal, revision.   2. Computerized image guidance, extradural     ATTENDING SURGEON:  Naeem Chapin MD      ASSISTANT:  None.     Review of systems: A 14-point review of systems has been conducted and is negative for any notable symptoms, except as dictated in the history of present illness.     Physical Exam:  Vital signs: Ht 1.651 m (5' 5\")   Wt 88.5 kg (195 lb)   BMI 32.45 kg/m     General Appearance: No acute distress, appropriate demeanor, conversant  Eyes: moist conjunctivae; EOMI; pupils symmetric; visual acuity grossly intact; no proptosis  Head: normocephalic; overall symmetric appearance without deformity  Face: overall symmetric without deformity; HB I/VI  Ears: Normal appearance of external ear;  Nose: No external " deformity; see nasal endoscopy    Procedure Note  Procedure performed: Rigid nasal endoscopy  Indication: To evaluate for sinonasal pathology not visualized on routine anterior rhinoscopy  Anesthesia: 4% topical lidocaine with 0.05 % oxymetazoline  Description of procedure: A 30 degree, 3 mm rigid endoscope was inserted into bilateral nasal cavities and the nasal valves, nasal cavity, middle meatus, sphenoethmoid recess, nasopharynx were evaluated for evidence of obstruction, edema, purulence, polyps and/or mass/lesion.     Findings  RT: Post surgical changes with scar contracture of maxillary sinus. Small focus of papilloma at the anterior maxillectomy site, treated with 5-fluorouracil cream.   LT: Clear.     The patient tolerated the procedure well without complication.     Laboratory Review:  n/a    Imaging Review:  n/a    Pathology Review:  SPECIMEN(S):   Sinus contents, right maxillary     FINAL DIAGNOSIS:   Sinus, right maxillary, contents:   - Inverted squamous (Schneiderian) papilloma     Assessment/Medical Decision Making:  S/p revision RT endo medial maxillectomy for resection of recurrent diffuse, multifocal IP of RT max sinus      Plan:   Patient with 1 potential papilloma, but was unable to extract due to location; treated topically with 5fu. Otherwise well improved appearance and dissipation of former papillomas. Plan to follow in 6 weeks.     Naeem Chapin MD    [facility]  Rhinology, Endoscopic Skull Base Surgery  [facility]  Department of Otolaryngology - Head & Neck Surgery    Scribe Disclosure:  I, Al Carrero, am serving as a scribe to document services personally performed by Naeem Chapin MD at this visit, based upon the provider's statements to me. All documentation has been reviewed by the aforementioned provider prior to being entered into the official medical record.     Additional portions of the patient's have been reviewed below.    ~~~~~~~~~~~~~~~~~~~~~~~~~~~~~~~~~~~~~~~~~~~~~~~~~~~~~~~~~~~~~~~~~~~~~~~~~~~~~~~~~~~~~~~~~~~~~~~~~~~~~~~~~~~~~~~~~~~~~~~~~~~~~~~~~~~~~~~    Past Medical History:   Diagnosis Date     Polyp of nasal cavity         Past Surgical History:   Procedure Laterality Date     OPTICAL TRACKING SYSTEM ENDOSCOPIC SINUS SURGERY Right 2/15/2021    Procedure: Right image guided endoscopic maxillary antrostomy with tissue removal;  Surgeon: Naeem Chapin MD;  Location: [facility] OR     SURGICAL HISTORY OF -       sinus surgery X 3     SURGICAL HISTORY OF -       wisdom teeth extraction     SURGICAL HISTORY OF -   1/10    breaset reduction     SURGICAL HISTORY OF -   12-18-10    Right endoscopic maxillary antrostomy with removal of contents with image guidance        Family History   Problem Relation Age of Onset     C.A.D. Mother      Hypertension Mother      Lipids Mother      Diabetes No family hx of      Cerebrovascular Disease No family hx of      Breast Cancer No family hx of      Cancer - colorectal No family hx of         Social History     Socioeconomic History     Marital status:      Spouse name: Not on file     Number of children: Not on file     Years of education: Not on file     Highest education level: Not on file   Occupational History     Not on file   Tobacco Use     Smoking status: Never Smoker     Smokeless tobacco: Never Used   Substance and Sexual Activity     Alcohol use: Yes     Comment: 1-2 drinks per month     Drug use: No     Sexual activity: Not on file   Other Topics Concern     Parent/sibling w/ CABG, MI or angioplasty before 65F 55M? Not Asked   Social History Narrative     Not on file     Social Determinants of Health     Financial Resource Strain:      Difficulty of Paying Living Expenses:    Food Insecurity:      Worried About Running Out of Food in the Last Year:      Ran Out of Food in the Last Year:    Transportation Needs:      Lack of Transportation (Medical):      Lack of  Transportation (Non-Medical):    Physical Activity:      Days of Exercise per Week:      Minutes of Exercise per Session:    Stress:      Feeling of Stress :    Social Connections:      Frequency of Communication with Friends and Family:      Frequency of Social Gatherings with Friends and Family:      Attends Muslim Services:      Active Member of Clubs or Organizations:      Attends Club or Organization Meetings:      Marital Status:    Intimate Partner Violence:      Fear of Current or Ex-Partner:      Emotionally Abused:      Physically Abused:      Sexually Abused:          Scribe Disclosure:  I, Al Carrero, am serving as a scribe to document services personally performed by Naeem Chapin MD at this visit, based upon the provider's statements to me. All documentation has been reviewed by the aforementioned provider prior to being entered into the official medical record.       Again, thank you for allowing me to participate in the care of your patient.      Sincerely,    Naeem Chapin MD

## 2021-07-14 NOTE — PATIENT INSTRUCTIONS
1. You were seen in the clinic today by Dr. Chapin.    3.   Plan to return the clinic in 6 weeks, please bring Efudex back in with you to your appointment.     If you have any questions or concerns after your appointment, please call the clinic.    -Clinic phone [phone]. Press option #1 for scheduling related needs. Press option #3 for nurse advice.     Marilyn Angeles, ALCONN  [phone]    lElie Javier, RNCC  [phone]    [facility]  Department of Otolaryngology

## 2021-08-31 ENCOUNTER — PATIENT OUTREACH (OUTPATIENT)
Dept: OTOLARYNGOLOGY | Facility: CLINIC | Age: 51
End: 2021-08-31

## 2021-08-31 NOTE — PROGRESS NOTES
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     Interval History:   Chio Bledsoe is a 50 year old female who presents for follow up. The patient underwent 4 papilloma removal surgeries with Dr. Gabriel, and her latest revision surgery for tumor removal earlier this year (02/15/2021) with me. At her last visit 7 weeks ago (07/14/2021), she reported continued snoring consistent with her sleep apnea. She otherwise denied any concerns or complaints. Also at this visit, it was possible that there was another papilloma in an area difficult to extract.    Today, she reports that she is doing well and she feels improved. She denies any current concerns or complaints.    Sino-Nasal Outcome Test (SNOT - 22)  1. Need to Blow Nose: (P) None  2. Nasal Blockage: (P) None  3. Sneezing: (P) None  4. Runny Nose: (P) None  5. Cough: (P) None  6. Post-nasal discharge: (P) None  7. Thick nasal discharge: (P) None  8. Ear fullness: (P) None  9. Dizziness: (P) None  10. Ear Pain: (P) None  11. Facial pain/pressure: (P) None  12. Decreased Sense of Smell/Taste: (P) None  13. Difficulty falling asleep: (P) Severe  14. Wake up at night: (P) Severe  15. Lack of a good night's sleep: (P) Severe  16. Wake up tired: (P) Moderate  17. Fatigue: (P) Mild or slight  18. Reduced Productivity: (P) Mild or slight  19. Reduced Concentration: (P) None  20. Frustrated/restless/irritable: (P) Very mild  21. Sad: (P) None  22. Embarrassed: (P) None  Total Score: (P) 20    [place] [facility]  Procedure Date: 02/15/2021      PREOPERATIVE DIAGNOSIS:   1.  Recurrent right maxillary sinus inverted papilloma       POSTOPERATIVE DIAGNOSIS:   1.  Recurrent right maxillary sinus inverted papilloma      PROCEDURE PERFORMED:    1.  "Right endoscopic maxillary antrostomy with tissue removal, revision.   2. Computerized image guidance, extradural     ATTENDING SURGEON:  Naeem Chapin MD      ASSISTANT:  None.     Review of systems: A 14-point review of systems has been conducted and is negative for any notable symptoms, except as dictated in the history of present illness.     Physical Exam:  Vital signs: Pulse 99   Temp 96.9  F (36.1  C) (Temporal)   Ht 1.651 m (5' 5\")   Wt 92.1 kg (203 lb)   SpO2 99%   BMI 33.78 kg/m     General Appearance: No acute distress, appropriate demeanor, conversant  Eyes: moist conjunctivae; EOMI; pupils symmetric; visual acuity grossly intact; no proptosis  Head: normocephalic; overall symmetric appearance without deformity  Face: overall symmetric without deformity; HB I/VI  Nose: No external deformity; inferior turbinates without significant hypertrophy  Neck: no palpable lymphadenopathy; thyroid without palpable nodules  Lungs: symmetric chest rise; no wheezing  CV: Good distal perfusion; normal hear rate  Extremities: No deformity  Neurologic Exam: Cranial nerves II-XII are grossly intact; no focal deficit    Procedure Note  Procedure performed: Rigid nasal endoscopy  Indication: To evaluate for sinonasal pathology not visualized on routine anterior rhinoscopy  Anesthesia: 4% topical lidocaine with 0.05 % oxymetazoline  Description of procedure: A 30 degree, 3 mm rigid endoscope was inserted into bilateral nasal cavities and the nasal valves, nasal cavity, middle meatus, sphenoethmoid recess, nasopharynx were evaluated for evidence of obstruction, edema, purulence, polyps and/or mass/lesion.       Findings  No evid of residual/recurrent IP  Small amount of topical 5-fluorouracil applied to previous area of concern, but no recurrent tumor seen    The patient tolerated the procedure well without complication.     Laboratory Review:  n/a    Imaging Review:  n/a    Pathology " Review:  n/a    Assessment/Medical Decision Making:  S/p revision RT endo medial maxillectomy for resection of recurrent diffuse, multifocal IP of RT max sinus    Plan:  Her exam looks much improved, and I am very pleased. No evidence of papilloma recurrence. I applied a small amount of fluorouracilcream to a small area in her nasal cavity. She can follow up with me in 3 months. She knows that she can return sooner for any new or worsening of symptoms.     Naeem Chapin MD    [facility]  Rhinology, Endoscopic Skull Base Surgery  [facility]  Department of Otolaryngology - Head & Neck Surgery    Scribe Disclosure:  I, Marilyn Tricia, am serving as a scribe to document services personally performed by Naeem Chapin MD at this visit, based upon the provider's statements to me. All documentation has been reviewed by the aforementioned provider prior to being entered into the official medical record.     Additional portions of the patient's history have been reviewed below.   ~~~~~~~~~~~~~~~~~~~~~~~~~~~~~~~~~~~~~~~~~~~~~~~~~~~~~~~~~~~~~~~~~~~~~~~~~~~~~~~~~~~~~~~~~~~~~~~~~~~~~~~~~~~~~~~~~~~~~~~~~~~~~~~~~~~~~~~    Past Medical History:   Diagnosis Date     Polyp of nasal cavity       Past Surgical History:   Procedure Laterality Date     OPTICAL TRACKING SYSTEM ENDOSCOPIC SINUS SURGERY Right 2/15/2021    Procedure: Right image guided endoscopic maxillary antrostomy with tissue removal;  Surgeon: Naeem Chapin MD;  Location: [facility] OR     SURGICAL HISTORY OF -       sinus surgery X 3     SURGICAL HISTORY OF -       wisdom teeth extraction     SURGICAL HISTORY OF -   1/10    breaset reduction     SURGICAL HISTORY OF -   12-18-10    Right endoscopic maxillary antrostomy with removal of contents with image guidance      Family History   Problem Relation Age of Onset     C.A.D. Mother      Hypertension Mother      Lipids Mother      Diabetes No family hx of       Cerebrovascular Disease No family hx of      Breast Cancer No family hx of      Cancer - colorectal No family hx of       Social History     Socioeconomic History     Marital status:      Spouse name: Not on file     Number of children: Not on file     Years of education: Not on file     Highest education level: Not on file   Occupational History     Not on file   Tobacco Use     Smoking status: Never Smoker     Smokeless tobacco: Never Used   Substance and Sexual Activity     Alcohol use: Yes     Comment: 1-2 drinks per month     Drug use: No     Sexual activity: Not on file   Other Topics Concern     Parent/sibling w/ CABG, MI or angioplasty before 65F 55M? Not Asked   Social History Narrative     Not on file     Social Determinants of Health     Financial Resource Strain:      Difficulty of Paying Living Expenses:    Food Insecurity:      Worried About Running Out of Food in the Last Year:      Ran Out of Food in the Last Year:    Transportation Needs:      Lack of Transportation (Medical):      Lack of Transportation (Non-Medical):    Physical Activity:      Days of Exercise per Week:      Minutes of Exercise per Session:    Stress:      Feeling of Stress :    Social Connections:      Frequency of Communication with Friends and Family:      Frequency of Social Gatherings with Friends and Family:      Attends Anabaptist Services:      Active Member of Clubs or Organizations:      Attends Club or Organization Meetings:      Marital Status:    Intimate Partner Violence:      Fear of Current or Ex-Partner:      Emotionally Abused:      Physically Abused:      Sexually Abused:

## 2021-08-31 NOTE — PROGRESS NOTES
Writer called patient to remind her to bring medication with her to her appointment tomorrow.     LM on      Ellie Javier RN on 8/31/2021 at 3:52 PM

## 2021-09-01 ENCOUNTER — OFFICE VISIT (OUTPATIENT)
Dept: OTOLARYNGOLOGY | Facility: CLINIC | Age: 51
End: 2021-09-01
Payer: COMMERCIAL

## 2021-09-01 VITALS
OXYGEN SATURATION: 99 % | HEIGHT: 65 IN | TEMPERATURE: 96.9 F | HEART RATE: 99 BPM | BODY MASS INDEX: 33.82 KG/M2 | WEIGHT: 203 LBS

## 2021-09-01 DIAGNOSIS — D14.0 INVERTED PAPILLOMA OF MAXILLARY SINUS: Primary | ICD-10-CM

## 2021-09-01 PROCEDURE — 99212 OFFICE O/P EST SF 10 MIN: CPT | Mod: 25 | Performed by: OTOLARYNGOLOGY

## 2021-09-01 PROCEDURE — 31231 NASAL ENDOSCOPY DX: CPT | Performed by: OTOLARYNGOLOGY

## 2021-09-01 ASSESSMENT — MIFFLIN-ST. JEOR: SCORE: 1536.68

## 2021-09-01 ASSESSMENT — PAIN SCALES - GENERAL: PAINLEVEL: NO PAIN (0)

## 2021-09-01 NOTE — NURSING NOTE
"Chief Complaint   Patient presents with     RECHECK     6 week follow up       Pulse 99, temperature 96.9  F (36.1  C), temperature source Temporal, height 1.651 m (5' 5\"), weight 92.1 kg (203 lb), SpO2 99 %, not currently breastfeeding.    Manjinder Salcedo, EMT  "

## 2021-09-01 NOTE — LETTER
9/1/2021       RE: Chio Bledsoe  [address] [facility] [address] [address]     Dear Colleague,    Thank you for referring your patient, Chio Bledsoe, to the [facility] [place] at [facility]. Please see a copy of my visit note below.      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     Interval History:   Chio Bledsoe is a 50 year old female who presents for follow up. The patient underwent 4 papilloma removal surgeries with Dr. Gabriel, and her latest revision surgery for tumor removal earlier this year (02/15/2021) with me. At her last visit 7 weeks ago (07/14/2021), she reported continued snoring consistent with her sleep apnea. She otherwise denied any concerns or complaints. Also at this visit, it was possible that there was another papilloma in an area difficult to extract.    Today, she reports that she is doing well and she feels improved. She denies any current concerns or complaints.    Sino-Nasal Outcome Test (SNOT - 22)  1. Need to Blow Nose: (P) None  2. Nasal Blockage: (P) None  3. Sneezing: (P) None  4. Runny Nose: (P) None  5. Cough: (P) None  6. Post-nasal discharge: (P) None  7. Thick nasal discharge: (P) None  8. Ear fullness: (P) None  9. Dizziness: (P) None  10. Ear Pain: (P) None  11. Facial pain/pressure: (P) None  12. Decreased Sense of Smell/Taste: (P) None  13. Difficulty falling asleep: (P) Severe  14. Wake up at night: (P) Severe  15. Lack of a good night's sleep: (P) Severe  16. Wake up tired: (P) Moderate  17. Fatigue: (P) Mild or slight  18. Reduced Productivity: (P) Mild or slight  19. Reduced Concentration: (P) None  20. Frustrated/restless/irritable: (P) Very  "mild  21. Sad: (P) None  22. Embarrassed: (P) None  Total Score: (P) 20    [place] [facility]  Procedure Date: 02/15/2021      PREOPERATIVE DIAGNOSIS:   1.  Recurrent right maxillary sinus inverted papilloma       POSTOPERATIVE DIAGNOSIS:   1.  Recurrent right maxillary sinus inverted papilloma      PROCEDURE PERFORMED:    1. Right endoscopic maxillary antrostomy with tissue removal, revision.   2. Computerized image guidance, extradural     ATTENDING SURGEON:  Naeem Chapin MD      ASSISTANT:  None.     Review of systems: A 14-point review of systems has been conducted and is negative for any notable symptoms, except as dictated in the history of present illness.     Physical Exam:  Vital signs: Pulse 99   Temp 96.9  F (36.1  C) (Temporal)   Ht 1.651 m (5' 5\")   Wt 92.1 kg (203 lb)   SpO2 99%   BMI 33.78 kg/m     General Appearance: No acute distress, appropriate demeanor, conversant  Eyes: moist conjunctivae; EOMI; pupils symmetric; visual acuity grossly intact; no proptosis  Head: normocephalic; overall symmetric appearance without deformity  Face: overall symmetric without deformity; HB I/VI  Nose: No external deformity; inferior turbinates without significant hypertrophy  Neck: no palpable lymphadenopathy; thyroid without palpable nodules  Lungs: symmetric chest rise; no wheezing  CV: Good distal perfusion; normal hear rate  Extremities: No deformity  Neurologic Exam: Cranial nerves II-XII are grossly intact; no focal deficit    Procedure Note  Procedure performed: Rigid nasal endoscopy  Indication: To evaluate for sinonasal pathology not visualized on routine anterior rhinoscopy  Anesthesia: 4% topical lidocaine with 0.05 % oxymetazoline  Description of procedure: A 30 degree, 3 mm rigid endoscope was inserted into bilateral nasal cavities and the nasal valves, nasal cavity, middle meatus, sphenoethmoid recess, nasopharynx were evaluated for evidence of obstruction, edema, purulence, polyps " and/or mass/lesion.       Findings  No evid of residual/recurrent IP  Small amount of topical 5-fluorouracil applied to previous area of concern, but no recurrent tumor seen    The patient tolerated the procedure well without complication.     Laboratory Review:  n/a    Imaging Review:  n/a    Pathology Review:  n/a    Assessment/Medical Decision Making:  S/p revision RT endo medial maxillectomy for resection of recurrent diffuse, multifocal IP of RT max sinus    Plan:  Her exam looks much improved, and I am very pleased. No evidence of papilloma recurrence. I applied a small amount of fluorouracilcream to a small area in her nasal cavity. She can follow up with me in 3 months. She knows that she can return sooner for any new or worsening of symptoms.     Naeem Chapin MD    [facility]  Rhinology, Endoscopic Skull Base Surgery  [facility]  Department of Otolaryngology - Head & Neck Surgery    Scribe Disclosure:  I, Marilyn Clarke, am serving as a scribe to document services personally performed by Naeem Chapin MD at this visit, based upon the provider's statements to me. All documentation has been reviewed by the aforementioned provider prior to being entered into the official medical record.     Additional portions of the patient's history have been reviewed below.   ~~~~~~~~~~~~~~~~~~~~~~~~~~~~~~~~~~~~~~~~~~~~~~~~~~~~~~~~~~~~~~~~~~~~~~~~~~~~~~~~~~~~~~~~~~~~~~~~~~~~~~~~~~~~~~~~~~~~~~~~~~~~~~~~~~~~~~~    Past Medical History:   Diagnosis Date     Polyp of nasal cavity       Past Surgical History:   Procedure Laterality Date     OPTICAL TRACKING SYSTEM ENDOSCOPIC SINUS SURGERY Right 2/15/2021    Procedure: Right image guided endoscopic maxillary antrostomy with tissue removal;  Surgeon: Naeem Chapin MD;  Location: [facility] OR     SURGICAL HISTORY OF -       sinus surgery X 3     SURGICAL HISTORY OF -       wisdom teeth extraction     SURGICAL HISTORY OF -   1/10     breaset reduction     SURGICAL HISTORY OF -   12-18-10    Right endoscopic maxillary antrostomy with removal of contents with image guidance      Family History   Problem Relation Age of Onset     C.A.D. Mother      Hypertension Mother      Lipids Mother      Diabetes No family hx of      Cerebrovascular Disease No family hx of      Breast Cancer No family hx of      Cancer - colorectal No family hx of       Social History     Socioeconomic History     Marital status:      Spouse name: Not on file     Number of children: Not on file     Years of education: Not on file     Highest education level: Not on file   Occupational History     Not on file   Tobacco Use     Smoking status: Never Smoker     Smokeless tobacco: Never Used   Substance and Sexual Activity     Alcohol use: Yes     Comment: 1-2 drinks per month     Drug use: No     Sexual activity: Not on file   Other Topics Concern     Parent/sibling w/ CABG, MI or angioplasty before 65F 55M? Not Asked   Social History Narrative     Not on file     Social Determinants of Health     Financial Resource Strain:      Difficulty of Paying Living Expenses:    Food Insecurity:      Worried About Running Out of Food in the Last Year:      Ran Out of Food in the Last Year:    Transportation Needs:      Lack of Transportation (Medical):      Lack of Transportation (Non-Medical):    Physical Activity:      Days of Exercise per Week:      Minutes of Exercise per Session:    Stress:      Feeling of Stress :    Social Connections:      Frequency of Communication with Friends and Family:      Frequency of Social Gatherings with Friends and Family:      Attends Yarsani Services:      Active Member of Clubs or Organizations:      Attends Club or Organization Meetings:      Marital Status:    Intimate Partner Violence:      Fear of Current or Ex-Partner:      Emotionally Abused:      Physically Abused:      Sexually Abused:       Again, thank you for allowing me to  participate in the care of your patient.      Sincerely,    Naeem Chapin MD

## 2021-09-01 NOTE — PROGRESS NOTES
[facility]  [address], [address]  [place] [address]  Phone: [phone]  Fax: [phone]  Primary Provider: Chio Rodriguez  Pre-op Performing Provider: RAINE YOUNGBLOOD    PREOPERATIVE EVALUATION:  Today's date: 9/2/2021    Chio Bledsoe is a 50 year old female who presents for a preoperative evaluation.    Surgical Information:  Surgery/Procedure: RIGHT knee arthroscopic partial medial meniscectomy and partial lateral meniscectomy  Surgery Location: [facility]  Surgeon: Eder Horta MD  Surgery Date: 9/8/2021  Time of Surgery: 2pm  Where patient plans to recover: At home with family  Fax number for surgical facility: [phone]    Type of Anesthesia Anticipated: to be determined    Assessment & Plan     The proposed surgical procedure is considered INTERMEDIATE risk.    1. Preop general physical exam: Medically optimized for proposed procedure. May take all prescriptions leading up to time of procedure. Up to date on tetanus. Check CBC and pregnancy test prior to procedure. May proceed as planned if normal. Lab tests to follow.  - CBC with platelets; Future  - HCG Qual, Urine ([id number]); Future    2. Tear of meniscus of left knee as current injury, unspecified meniscus, unspecified tear type, subsequent encounter: Procedure as above.    Addendum: Lab results came back with a negative pregnancy test as well as normal cell counts.  She does have a slightly low MCV possibly indicating low iron stores.  Should be followed up with PCP but should not delay the surgery.    Risks and Recommendations:  The patient has the following additional risks and recommendations for perioperative complications:   - No identified additional risk factors other than previously addressed      Medication Instructions:   - SSRIs, SNRIs, TCAs, Antipsychotics: Continue without modification.  (Trazocone)    RECOMMENDATION:  APPROVAL GIVEN to proceed with proposed  procedure, without further diagnostic evaluation.      Subjective     HPI related to upcoming procedure: History of torn right meniscectomy, without injury. Causing right knee pain. Plan for arthroscopic procedure.      Preop Questions 9/2/2021   1. Have you ever had a heart attack or stroke? No   2. Have you ever had surgery on your heart or blood vessels, such as a stent placement, a coronary artery bypass, or surgery on an artery in your head, neck, heart, or legs? No   3. Do you have chest pain with activity? No   4. Do you have a history of  heart failure? No   5. Do you currently have a cold, bronchitis or symptoms of other infection? No   6. Do you have a cough, shortness of breath, or wheezing? No   7. Do you or anyone in your family have previous history of blood clots? No   8. Do you or does anyone in your family have a serious bleeding problem such as prolonged bleeding following surgeries or cuts? No   9. Have you ever had problems with anemia or been told to take iron pills? No   10. Have you had any abnormal blood loss such as black, tarry or bloody stools, or abnormal vaginal bleeding? No   11. Have you ever had a blood transfusion? No   12. Are you willing to have a blood transfusion if it is medically needed before, during, or after your surgery? Yes   13. Have you or any of your relatives ever had problems with anesthesia? No   14. Do you have sleep apnea, excessive snoring or daytime drowsiness? No   14a. Do you have a CPAP machine? -   15. Do you have any artifical heart valves or other implanted medical devices like a pacemaker, defibrillator, or continuous glucose monitor? No   16. Do you have artificial joints? No   17. Are you allergic to latex? No   18. Is there any chance that you may be pregnant? No     Health Care Directive:  Patient does not have a Health Care Directive or Living Will: () Tyler Franciscoisen - [phone]    Preoperative Review of :   reviewed - no record of  controlled substances prescribed.    Status of Chronic Conditions:  SLEEP PROBLEM - Patient has a longstanding history of insomnia. Patient has tried Trazodone medications with limited success.     Manges periods with birth control. Still having periods. LMP was 2 months ago.      Review of Systems  Constitutional, neuro, ENT, endocrine, pulmonary, cardiac, gastrointestinal, genitourinary, musculoskeletal, integument and psychiatric systems are negative, except as otherwise noted.    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     Priority: Medium     Pap smear for cervical cancer screening 03/19/2016     Priority: Medium     4/8/11 ASCUS with negative HPV  5/14/12 NILM  3/18/2016  NILM, HPV neg  6/2020 NIL/HPV negative    Plan: routine screening, Pap/HPV next due 6/2025       Inverted papilloma of maxillary sinus 10/29/2014     Priority: Medium     Added automatically from request for surgery [id number]       CARDIOVASCULAR SCREENING; LDL GOAL LESS THAN 160 10/31/2010     Priority: Medium     SINUS POLYP 09/13/2005     Priority: Medium      Past Medical History:   Diagnosis Date     Polyp of nasal cavity      Past Surgical History:   Procedure Laterality Date     OPTICAL TRACKING SYSTEM ENDOSCOPIC SINUS SURGERY Right 2/15/2021    Procedure: Right image guided endoscopic maxillary antrostomy with tissue removal;  Surgeon: Naeem Chapin MD;  Location: [facility] OR     SURGICAL HISTORY OF -       sinus surgery X 3     SURGICAL HISTORY OF -       wisdom teeth extraction     SURGICAL HISTORY OF -   1/10    breaset reduction     SURGICAL HISTORY OF -   12-18-10    Right endoscopic maxillary antrostomy with removal of contents with image guidance  "    Current Outpatient Medications   Medication Sig Dispense Refill     fluorouracil (EFUDEX) 5 % external cream MD to apply intranasally in office as needed 40 g 0     levonorgest-eth estrad 91-Day (SEASONIQUE) 0.15-0.03 &0.01 MG tablet Take 1 tablet by mouth       sodium chloride (OCEAN) 0.65 % nasal spray 2 sprays in right nostril four times daily 88 mL 1     traZODone (DESYREL) 50 MG tablet Take 0.5-1 tablets (25-50 mg) by mouth At Bedtime 45 tablet 1       Allergies   Allergen Reactions     [place] N[employer] Anxiety        Social History     Tobacco Use     Smoking status: Never Smoker     Smokeless tobacco: Never Used   Substance Use Topics     Alcohol use: Yes     Comment: 1-2 drinks per month     Family History   Problem Relation Age of Onset     C.A.D. Mother      Hypertension Mother      Lipids Mother      Diabetes No family hx of      Cerebrovascular Disease No family hx of      Breast Cancer No family hx of      Cancer - colorectal No family hx of      History   Drug Use No         Objective     /74   Pulse 92   Temp 98.4  F (36.9  C) (Temporal)   Resp 16   Ht 1.651 m (5' 5\")   Wt 93 kg (205 lb)   LMP 07/01/2021 (Approximate)   SpO2 97%   BMI 34.11 kg/m      Physical Exam    GENERAL APPEARANCE: healthy, alert and no distress     EYES: EOMI, PERRL     HENT: ear canals and TM's normal and nose and mouth without ulcers or lesions     NECK: no adenopathy, no asymmetry, masses, or scars and thyroid normal to palpation     RESP: lungs clear to auscultation - no rales, rhonchi or wheezes     CV: regular rates and rhythm, normal S1 S2, no S3 or S4 and no murmur, click or rub     ABDOMEN:  soft, nontender, no HSM or masses and bowel sounds normal     MS: extremities normal- no gross deformities noted, no evidence of inflammation in joints, FROM in all extremities.     SKIN: no suspicious lesions or rashes     NEURO: Normal strength and tone, sensory exam grossly normal, mentation intact and speech " normal     PSYCH: mentation appears normal. and affect normal/bright     LYMPHATICS: No cervical adenopathy    Diagnostics:  Results for WILLY CARR (MRN [id number]) as of 9/2/2021 17:04   Ref. Range 9/2/2021 16:42   HCG Qual Urine Latest Ref Range: Negative  Negative     Results for WILLY CARR (MRN [id number]) as of 9/2/2021 17:04   Ref. Range 9/2/2021 16:39   WBC Latest Ref Range: 4.0 - 11.0 10e3/uL 7.4   Hemoglobin Latest Ref Range: 11.7 - 15.7 g/dL 12.3   Hematocrit Latest Ref Range: 35.0 - 47.0 % 40.3   Platelet Count Latest Ref Range: 150 - 450 10e3/uL 394   RBC Count Latest Ref Range: 3.80 - 5.20 10e6/uL 5.51 (H)   MCV Latest Ref Range: 78 - 100 fL 73 (L)   MCH Latest Ref Range: 26.5 - 33.0 pg 22.3 (L)   MCHC Latest Ref Range: 31.5 - 36.5 g/dL 30.5 (L)   RDW Latest Ref Range: 10.0 - 15.0 % 19.0 (H)       No EKG required, no history of coronary heart disease, significant arrhythmia, peripheral arterial disease or other structural heart disease.    Revised Cardiac Risk Index (RCRI):  The patient has the following serious cardiovascular risks for perioperative complications:   - No serious cardiac risks = 0 points     RCRI Interpretation: 0 points: Class I (very low risk - 0.4% complication rate)     Signed Electronically by: Jonnie Kenny MD  Copy of this evaluation report is provided to requesting physician.

## 2021-09-01 NOTE — PATIENT INSTRUCTIONS
1. You were seen in the clinic today by Dr. Chapin.    2.   Plan to return the clinic in 3 months     If you have any questions or concerns after your appointment, please call the clinic.    -Clinic phone [phone]. Press option #1 for scheduling related needs. Press option #3 for nurse advice.     Marilyn Angeles LPN  [phone]    Ellie Javier, RNCC  [phone]    [facility]  Department of Otolaryngology

## 2021-09-01 NOTE — PATIENT INSTRUCTIONS

## 2021-09-02 ENCOUNTER — OFFICE VISIT (OUTPATIENT)
Dept: FAMILY MEDICINE | Facility: CLINIC | Age: 51
End: 2021-09-02
Payer: COMMERCIAL

## 2021-09-02 VITALS
BODY MASS INDEX: 34.16 KG/M2 | SYSTOLIC BLOOD PRESSURE: 126 MMHG | OXYGEN SATURATION: 97 % | HEIGHT: 65 IN | DIASTOLIC BLOOD PRESSURE: 74 MMHG | HEART RATE: 92 BPM | TEMPERATURE: 98.4 F | RESPIRATION RATE: 16 BRPM | WEIGHT: 205 LBS

## 2021-09-02 DIAGNOSIS — G47.30 SLEEP-DISORDERED BREATHING: ICD-10-CM

## 2021-09-02 DIAGNOSIS — Z01.818 PREOP GENERAL PHYSICAL EXAM: Primary | ICD-10-CM

## 2021-09-02 DIAGNOSIS — S83.207D TEAR OF MENISCUS OF LEFT KNEE AS CURRENT INJURY, UNSPECIFIED MENISCUS, UNSPECIFIED TEAR TYPE, SUBSEQUENT ENCOUNTER: ICD-10-CM

## 2021-09-02 DIAGNOSIS — D14.0 INVERTED PAPILLOMA OF MAXILLARY SINUS: ICD-10-CM

## 2021-09-02 PROBLEM — F32.0 MILD MAJOR DEPRESSION, SINGLE EPISODE (H): Status: RESOLVED | Noted: 2021-02-08 | Resolved: 2021-09-02

## 2021-09-02 LAB
ERYTHROCYTE [DISTWIDTH] IN BLOOD BY AUTOMATED COUNT: 19 % (ref 10–15)
HCG UR QL: NEGATIVE
HCT VFR BLD AUTO: 40.3 % (ref 35–47)
HGB BLD-MCNC: 12.3 G/DL (ref 11.7–15.7)
MCH RBC QN AUTO: 22.3 PG (ref 26.5–33)
MCHC RBC AUTO-ENTMCNC: 30.5 G/DL (ref 31.5–36.5)
MCV RBC AUTO: 73 FL (ref 78–100)
PLATELET # BLD AUTO: 394 10E3/UL (ref 150–450)
RBC # BLD AUTO: 5.51 10E6/UL (ref 3.8–5.2)
WBC # BLD AUTO: 7.4 10E3/UL (ref 4–11)

## 2021-09-02 PROCEDURE — 85027 COMPLETE CBC AUTOMATED: CPT | Performed by: FAMILY MEDICINE

## 2021-09-02 PROCEDURE — 99214 OFFICE O/P EST MOD 30 MIN: CPT | Performed by: FAMILY MEDICINE

## 2021-09-02 PROCEDURE — 36415 COLL VENOUS BLD VENIPUNCTURE: CPT | Performed by: FAMILY MEDICINE

## 2021-09-02 PROCEDURE — 81025 URINE PREGNANCY TEST: CPT | Performed by: FAMILY MEDICINE

## 2021-09-02 RX ORDER — TRAZODONE HYDROCHLORIDE 50 MG/1
25-50 TABLET, FILM COATED ORAL AT BEDTIME
Qty: 45 TABLET | Refills: 1 | Status: SHIPPED | OUTPATIENT
Start: 2021-09-02

## 2021-09-02 ASSESSMENT — ANXIETY QUESTIONNAIRES
GAD7 TOTAL SCORE: 0
3. WORRYING TOO MUCH ABOUT DIFFERENT THINGS: NOT AT ALL
1. FEELING NERVOUS, ANXIOUS, OR ON EDGE: NOT AT ALL
2. NOT BEING ABLE TO STOP OR CONTROL WORRYING: NOT AT ALL
8. IF YOU CHECKED OFF ANY PROBLEMS, HOW DIFFICULT HAVE THESE MADE IT FOR YOU TO DO YOUR WORK, TAKE CARE OF THINGS AT HOME, OR GET ALONG WITH OTHER PEOPLE?: NOT DIFFICULT AT ALL
5. BEING SO RESTLESS THAT IT IS HARD TO SIT STILL: NOT AT ALL
7. FEELING AFRAID AS IF SOMETHING AWFUL MIGHT HAPPEN: NOT AT ALL
6. BECOMING EASILY ANNOYED OR IRRITABLE: NOT AT ALL
4. TROUBLE RELAXING: NOT AT ALL
7. FEELING AFRAID AS IF SOMETHING AWFUL MIGHT HAPPEN: NOT AT ALL
GAD7 TOTAL SCORE: 0
GAD7 TOTAL SCORE: 0

## 2021-09-02 ASSESSMENT — PATIENT HEALTH QUESTIONNAIRE - PHQ9
SUM OF ALL RESPONSES TO PHQ QUESTIONS 1-9: 3
10. IF YOU CHECKED OFF ANY PROBLEMS, HOW DIFFICULT HAVE THESE PROBLEMS MADE IT FOR YOU TO DO YOUR WORK, TAKE CARE OF THINGS AT HOME, OR GET ALONG WITH OTHER PEOPLE: NOT DIFFICULT AT ALL
SUM OF ALL RESPONSES TO PHQ QUESTIONS 1-9: 3

## 2021-09-02 ASSESSMENT — MIFFLIN-ST. JEOR: SCORE: 1545.75

## 2021-09-02 ASSESSMENT — PAIN SCALES - GENERAL: PAINLEVEL: NO PAIN (1)

## 2021-09-02 NOTE — RESULT ENCOUNTER NOTE
Please inform of results if patient has not viewed in [employer].    Your pregnancy lab results came back negative. You are not pregnant. Your cell counts were normal, but your red blood cells were on the small side possibly indicating low iron stores. This will not disrupt your procedure, but should be followed up by your primary doctor.    Please call the clinic with any questions you may have.     Have a great day,    Dr. Arevalo

## 2021-09-03 ASSESSMENT — ANXIETY QUESTIONNAIRES: GAD7 TOTAL SCORE: 0

## 2021-09-03 ASSESSMENT — PATIENT HEALTH QUESTIONNAIRE - PHQ9: SUM OF ALL RESPONSES TO PHQ QUESTIONS 1-9: 3

## 2021-09-05 ENCOUNTER — HEALTH MAINTENANCE LETTER (OUTPATIENT)
Age: 51
End: 2021-09-05

## 2021-11-15 ENCOUNTER — MYC MEDICAL ADVICE (OUTPATIENT)
Dept: FAMILY MEDICINE | Facility: CLINIC | Age: 51
End: 2021-11-15
Payer: COMMERCIAL

## 2021-11-17 NOTE — TELEPHONE ENCOUNTER
Returned call to Fanta   All of pt's medications were reviewed and the reasons were discussed as to why pt is prescribed the med  Pt would like to get off of some of his medications and understand why he takes the meds that he does take  Appt with Dr Beebe on 4/10/18 neuro out of [facility] - to discuss his depakote medication  A whole new psych eval needs to be done - this will cost a lot financially to the family for this appt   The family agrees that the Pt should stay on zoloft due to his Hx of anger problems / depression problems.  Sister states Pt would be a danger to himself /family if he stopped taking zoloft and they do not want pt taken off of this medication  His anger \"is what caused the accident\" per Fanta  Pt had history of throwing things when angry and was not positive about his place in life  Now his mood is stable and pt is happy.    Appt with neuro will be kept.  Upcoming appt with PCP on 8/30/18  Writer encouraged an earlier appt if Fanta would like to come in sooner with pt and discuss his meds  Fanta verbalizes understanding           Writer responded via [employer].    Nydia Saldivar RN  [facility]

## 2021-11-29 NOTE — PATIENT INSTRUCTIONS
"1. You were seen in the clinic today by Dr. Chapin. If you have any questions or concerns after your appointment, please call the clinic at [phone]. Press \"1\" for scheduling, press \"3\" for nurse advice.    2.   Plan to return the clinic in 6 months.       Marilyn Angeles LPN  [facility]  Department of Otolaryngology  [phone]    "

## 2021-11-30 NOTE — PROGRESS NOTES
[facility]  Return Visit  Encounter date:   December 1, 2021    Chief Complaint:   Recurrent inverted papilloma; follow up     ID:  Recurrent RT IP of max sinus  S/p revision surgery as below    Interval History:   Chio Bledsoe is a 51 year old female who presents for follow up. The patient underwent 4 papilloma removal surgeries with Dr. Gabreil, and her latest revision surgery for tumor removal earlier this year (02/15/2021) with me. She was last seen in clinic on 09/01/2021 and reported she was doing well with no concerns or complaints.     Today, the patient states she is again doing well and denies any concerns or complaints. She notes she feels the cold air more on the right during the winter.     [place] [facility]  Procedure Date: 02/15/2021      PREOPERATIVE DIAGNOSIS:   1.  Recurrent right maxillary sinus inverted papilloma       POSTOPERATIVE DIAGNOSIS:   1.  Recurrent right maxillary sinus inverted papilloma      PROCEDURE PERFORMED:    1. Right endoscopic maxillary antrostomy with tissue removal, revision.   2. Computerized image guidance, extradural     ATTENDING SURGEON:  Naeem Chapin MD     Review of systems: A 14-point review of systems has been conducted and is negative for any notable symptoms, except as dictated in the history of present illness.     Physical Exam:  Vital signs: There were no vitals taken for this visit.   General Appearance: No acute distress, appropriate demeanor, conversant  Eyes: moist conjunctivae; EOMI; pupils symmetric; visual acuity grossly intact; no proptosis  Head: normocephalic; overall symmetric appearance without deformity  Face: overall symmetric without deformity; HB I/VI  Nose: No external deformity; see endoscopy note  Lungs: symmetric chest rise; no wheezing  CV: Good distal perfusion; normal hear rate  Extremities: No deformity  Neurologic Exam: Cranial nerves II-XII are grossly intact; no focal deficit      Procedure  Note  Procedure performed: Rigid nasal endoscopy  Indication: To evaluate for sinonasal pathology not visualized on routine anterior rhinoscopy  Anesthesia: 4% topical lidocaine with 0.05 % oxymetazoline  Description of procedure: A 70 degree, 3 mm rigid endoscope was inserted into bilateral nasal cavities and the nasal valves, nasal cavity, middle meatus, sphenoethmoid recess, nasopharynx were evaluated for evidence of obstruction, edema, purulence, polyps and/or mass/lesion.     Findings  RT: Post surgical cavity in the right maxillary sinus. No evidence of papilloma recurrence.     The patient tolerated the procedure well without complication.     Laboratory Review:  n/a    Imaging Review:  n/a    Pathology Review:  SPECIMEN(S):   Sinus contents, right maxillary     FINAL DIAGNOSIS:   Sinus, right maxillary, contents:   - Inverted squamous (Schneiderian) papilloma     Assessment/Medical Decision Making:  Recurrent RT IP of max sinus  S/p revision surgery as below        Plan:  Her exam today appears stable with no evidence of papilloma recurrence. She can follow-up in 6 months.     Naeem Chapin MD    [facility]  Rhinology, Endoscopic Skull Base Surgery  [facility]  Department of Otolaryngology - Head & Neck Surgery    Scribe Disclosure:  I, Olesya Flores, am serving as a scribe to document services personally performed by Naeem Chapin MD at this visit, based upon the provider's statements to me. All documentation has been reviewed by the aforementioned provider prior to being entered into the official medical record.    Additional portions of the patient's history have been reviewed below.   ~~~~~~~~~~~~~~~~~~~~~~~~~~~~~~~~~~~~~~~~~~~~~~~~~~~~~~~~~~~~~~~~~~~~~~~~~~~~~~~~~~~~~~~~~~~~~~~~~~~~~~~~~~~~~~~~~~~~~~~~~~~~~~~~~~~~~~~    Past Medical History:   Diagnosis Date     Anxiety      Gastroesophageal reflux disease 1/1/16     Nasal polyps      Polyp of nasal  cavity      Sleep apnea 1/1/17        Past Surgical History:   Procedure Laterality Date     NASAL/SINUS POLYPECTOMY  1/1/10    multiple surgeries     OPTICAL TRACKING SYSTEM ENDOSCOPIC SINUS SURGERY Right 2/15/2021    Procedure: Right image guided endoscopic maxillary antrostomy with tissue removal;  Surgeon: Naeem Chapin MD;  Location: [facility] OR     SURGICAL HISTORY OF -       sinus surgery X 3     SURGICAL HISTORY OF -       wisdom teeth extraction     SURGICAL HISTORY OF -   1/10    breaset reduction     SURGICAL HISTORY OF -   12-18-10    Right endoscopic maxillary antrostomy with removal of contents with image guidance        Family History   Problem Relation Age of Onset     C.A.D. Mother      Hypertension Mother      Lipids Mother      Heart Disease Mother      Dementia Mother      Stomach Problem Mother         Acid Reflux     Dementia Father      Stomach Problem Father         Acid Reflux     Mental Illness Paternal Grandmother      Diabetes No family hx of      Cerebrovascular Disease No family hx of      Breast Cancer No family hx of      Cancer - colorectal No family hx of         Social History     Socioeconomic History     Marital status:      Spouse name: None     Number of children: None     Years of education: None     Highest education level: None   Occupational History     None   Tobacco Use     Smoking status: Never Smoker     Smokeless tobacco: Never Used   Vaping Use     Vaping Use: Never used   Substance and Sexual Activity     Alcohol use: Yes     Comment: 1-2 drinks per month     Drug use: No     Sexual activity: Yes     Partners: Male     Birth control/protection: Pill   Other Topics Concern     Parent/sibling w/ CABG, MI or angioplasty before 65F 55M? Not Asked   Social History Narrative     None     Social Determinants of Health     Financial Resource Strain: Not on file   Food Insecurity: Not on file   Transportation Needs: Not on file   Physical Activity: Not on file    Stress: Not on file   Social Connections: Not on file   Intimate Partner Violence: Not on file   Housing Stability: Not on file

## 2021-12-01 ENCOUNTER — OFFICE VISIT (OUTPATIENT)
Dept: OTOLARYNGOLOGY | Facility: CLINIC | Age: 51
End: 2021-12-01
Payer: COMMERCIAL

## 2021-12-01 DIAGNOSIS — D14.0 INVERTED PAPILLOMA OF MAXILLARY SINUS: ICD-10-CM

## 2021-12-01 DIAGNOSIS — J32.0 CHRONIC MAXILLARY SINUSITIS: Primary | ICD-10-CM

## 2021-12-01 PROCEDURE — 31231 NASAL ENDOSCOPY DX: CPT | Performed by: OTOLARYNGOLOGY

## 2021-12-01 RX ORDER — ASPIRIN 81 MG/1
1 TABLET, CHEWABLE ORAL
COMMUNITY

## 2021-12-01 RX ORDER — TRETINOIN 0.25 MG/G
CREAM TOPICAL
COMMUNITY
Start: 2021-11-03

## 2021-12-01 RX ORDER — VENLAFAXINE HYDROCHLORIDE 37.5 MG/1
CAPSULE, EXTENDED RELEASE ORAL
COMMUNITY
Start: 2021-11-22

## 2021-12-01 ASSESSMENT — PAIN SCALES - GENERAL: PAINLEVEL: NO PAIN (0)

## 2021-12-01 NOTE — LETTER
12/1/2021      RE: Chio Bledsoe  1987 [facility] [id number]         [facility]  Return Visit  Encounter date:   December 1, 2021    Chief Complaint:   Recurrent inverted papilloma; follow up     ID:  Recurrent RT IP of max sinus  S/p revision surgery as below    Interval History:   Chio Bledsoe is a 51 year old female who presents for follow up. The patient underwent 4 papilloma removal surgeries with Dr. Gabriel, and her latest revision surgery for tumor removal earlier this year (02/15/2021) with me. She was last seen in clinic on 09/01/2021 and reported she was doing well with no concerns or complaints.     Today, the patient states she is again doing well and denies any concerns or complaints. She notes she feels the cold air more on the right during the winter.     [place] [facility]  Procedure Date: 02/15/2021      PREOPERATIVE DIAGNOSIS:   1.  Recurrent right maxillary sinus inverted papilloma       POSTOPERATIVE DIAGNOSIS:   1.  Recurrent right maxillary sinus inverted papilloma      PROCEDURE PERFORMED:    1. Right endoscopic maxillary antrostomy with tissue removal, revision.   2. Computerized image guidance, extradural     ATTENDING SURGEON:  Naeem Chapin MD     Review of systems: A 14-point review of systems has been conducted and is negative for any notable symptoms, except as dictated in the history of present illness.     Physical Exam:  Vital signs: There were no vitals taken for this visit.   General Appearance: No acute distress, appropriate demeanor, conversant  Eyes: moist conjunctivae; EOMI; pupils symmetric; visual acuity grossly intact; no proptosis  Head: normocephalic; overall symmetric appearance without deformity  Face: overall symmetric without deformity; HB I/VI  Nose: No external deformity; see endoscopy note  Lungs: symmetric chest rise; no wheezing  CV: Good distal perfusion; normal hear rate  Extremities: No deformity  Neurologic  Exam: Cranial nerves II-XII are grossly intact; no focal deficit      Procedure Note  Procedure performed: Rigid nasal endoscopy  Indication: To evaluate for sinonasal pathology not visualized on routine anterior rhinoscopy  Anesthesia: 4% topical lidocaine with 0.05 % oxymetazoline  Description of procedure: A 70 degree, 3 mm rigid endoscope was inserted into bilateral nasal cavities and the nasal valves, nasal cavity, middle meatus, sphenoethmoid recess, nasopharynx were evaluated for evidence of obstruction, edema, purulence, polyps and/or mass/lesion.     Findings  RT: Post surgical cavity in the right maxillary sinus. No evidence of papilloma recurrence.     The patient tolerated the procedure well without complication.     Laboratory Review:  n/a    Imaging Review:  n/a    Pathology Review:  SPECIMEN(S):   Sinus contents, right maxillary     FINAL DIAGNOSIS:   Sinus, right maxillary, contents:   - Inverted squamous (Schneiderian) papilloma     Assessment/Medical Decision Making:  Recurrent RT IP of max sinus  S/p revision surgery as below        Plan:  Her exam today appears stable with no evidence of papilloma recurrence. She can follow-up in 6 months.     Naeem Chapin MD    [facility]  Rhinology, Endoscopic Skull Base Surgery  [facility]  Department of Otolaryngology - Head & Neck Surgery    Scribe Disclosure:  I, Olesya Flores, am serving as a scribe to document services personally performed by Naeem Chapin MD at this visit, based upon the provider's statements to me. All documentation has been reviewed by the aforementioned provider prior to being entered into the official medical record.    Additional portions of the patient's history have been reviewed below.   ~~~~~~~~~~~~~~~~~~~~~~~~~~~~~~~~~~~~~~~~~~~~~~~~~~~~~~~~~~~~~~~~~~~~~~~~~~~~~~~~~~~~~~~~~~~~~~~~~~~~~~~~~~~~~~~~~~~~~~~~~~~~~~~~~~~~~~~    Past Medical History:   Diagnosis Date     Anxiety       Gastroesophageal reflux disease 1/1/16     Nasal polyps      Polyp of nasal cavity      Sleep apnea 1/1/17        Past Surgical History:   Procedure Laterality Date     NASAL/SINUS POLYPECTOMY  1/1/10    multiple surgeries     OPTICAL TRACKING SYSTEM ENDOSCOPIC SINUS SURGERY Right 2/15/2021    Procedure: Right image guided endoscopic maxillary antrostomy with tissue removal;  Surgeon: Naeem Chapin MD;  Location: [facility] OR     SURGICAL HISTORY OF -       sinus surgery X 3     SURGICAL HISTORY OF -       wisdom teeth extraction     SURGICAL HISTORY OF -   1/10    breaset reduction     SURGICAL HISTORY OF -   12-18-10    Right endoscopic maxillary antrostomy with removal of contents with image guidance        Family History   Problem Relation Age of Onset     C.A.D. Mother      Hypertension Mother      Lipids Mother      Heart Disease Mother      Dementia Mother      Stomach Problem Mother         Acid Reflux     Dementia Father      Stomach Problem Father         Acid Reflux     Mental Illness Paternal Grandmother      Diabetes No family hx of      Cerebrovascular Disease No family hx of      Breast Cancer No family hx of      Cancer - colorectal No family hx of         Social History     Socioeconomic History     Marital status:      Spouse name: None     Number of children: None     Years of education: None     Highest education level: None   Occupational History     None   Tobacco Use     Smoking status: Never Smoker     Smokeless tobacco: Never Used   Vaping Use     Vaping Use: Never used   Substance and Sexual Activity     Alcohol use: Yes     Comment: 1-2 drinks per month     Drug use: No     Sexual activity: Yes     Partners: Male     Birth control/protection: Pill   Other Topics Concern     Parent/sibling w/ CABG, MI or angioplasty before 65F 55M? Not Asked   Social History Narrative     None     Social Determinants of Health     Financial Resource Strain: Not on file   Food Insecurity: Not on  file   Transportation Needs: Not on file   Physical Activity: Not on file   Stress: Not on file   Social Connections: Not on file   Intimate Partner Violence: Not on file   Housing Stability: Not on file          Naeem Chapin MD

## 2021-12-01 NOTE — NURSING NOTE
Chief Complaint   Patient presents with     RECHECK     3 month follow up      Moose aCldera LPN

## 2021-12-01 NOTE — LETTER
12/1/2021       RE: Chio Bledsoe  1987 [facility] [address]     Dear Colleague,    Thank you for referring your patient, Chio Bledsoe, to the [facility] [place] at [facility]. Please see a copy of my visit note below.      [facility]  Return Visit  Encounter date:   December 1, 2021    Chief Complaint:   Recurrent inverted papilloma; follow up     ID:  Recurrent RT IP of max sinus  S/p revision surgery as below    Interval History:   Chio Bledsoe is a 51 year old female who presents for follow up. The patient underwent 4 papilloma removal surgeries with Dr. Gabriel, and her latest revision surgery for tumor removal earlier this year (02/15/2021) with me. She was last seen in clinic on 09/01/2021 and reported she was doing well with no concerns or complaints.     Today, the patient states she is again doing well and denies any concerns or complaints. She notes she feels the cold air more on the right during the winter.     [place] Operative [facility]  Procedure Date: 02/15/2021      PREOPERATIVE DIAGNOSIS:   1.  Recurrent right maxillary sinus inverted papilloma       POSTOPERATIVE DIAGNOSIS:   1.  Recurrent right maxillary sinus inverted papilloma      PROCEDURE PERFORMED:    1. Right endoscopic maxillary antrostomy with tissue removal, revision.   2. Computerized image guidance, extradural     ATTENDING SURGEON:  Naeem Chapin MD     Review of systems: A 14-point review of systems has been conducted and is negative for any notable symptoms, except as dictated in the history of present illness.     Physical Exam:  Vital signs: There were no vitals taken for this visit.   General Appearance: No acute distress, appropriate demeanor, conversant  Eyes: moist conjunctivae; EOMI; pupils symmetric; visual acuity grossly intact; no proptosis  Head: normocephalic; overall symmetric  appearance without deformity  Face: overall symmetric without deformity; HB I/VI  Nose: No external deformity; see endoscopy note  Lungs: symmetric chest rise; no wheezing  CV: Good distal perfusion; normal hear rate  Extremities: No deformity  Neurologic Exam: Cranial nerves II-XII are grossly intact; no focal deficit      Procedure Note  Procedure performed: Rigid nasal endoscopy  Indication: To evaluate for sinonasal pathology not visualized on routine anterior rhinoscopy  Anesthesia: 4% topical lidocaine with 0.05 % oxymetazoline  Description of procedure: A 70 degree, 3 mm rigid endoscope was inserted into bilateral nasal cavities and the nasal valves, nasal cavity, middle meatus, sphenoethmoid recess, nasopharynx were evaluated for evidence of obstruction, edema, purulence, polyps and/or mass/lesion.     Findings  RT: Post surgical cavity in the right maxillary sinus. No evidence of papilloma recurrence.     The patient tolerated the procedure well without complication.     Laboratory Review:  n/a    Imaging Review:  n/a    Pathology Review:  SPECIMEN(S):   Sinus contents, right maxillary     FINAL DIAGNOSIS:   Sinus, right maxillary, contents:   - Inverted squamous (Schneiderian) papilloma     Assessment/Medical Decision Making:  Recurrent RT IP of max sinus  S/p revision surgery as below        Plan:  Her exam today appears stable with no evidence of papilloma recurrence. She can follow-up in 6 months.     Naeem Chapin MD    [facility]  Rhinology, Endoscopic Skull Base Surgery  [facility]  Department of Otolaryngology - Head & Neck Surgery    Scribe Disclosure:  Olesya PULIDO, am serving as a scribe to document services personally performed by Naeem Chapin MD at this visit, based upon the provider's statements to me. All documentation has been reviewed by the aforementioned provider prior to being entered into the official medical  record.    Additional portions of the patient's history have been reviewed below.   ~~~~~~~~~~~~~~~~~~~~~~~~~~~~~~~~~~~~~~~~~~~~~~~~~~~~~~~~~~~~~~~~~~~~~~~~~~~~~~~~~~~~~~~~~~~~~~~~~~~~~~~~~~~~~~~~~~~~~~~~~~~~~~~~~~~~~~~    Past Medical History:   Diagnosis Date     Anxiety      Gastroesophageal reflux disease 1/1/16     Nasal polyps      Polyp of nasal cavity      Sleep apnea 1/1/17        Past Surgical History:   Procedure Laterality Date     NASAL/SINUS POLYPECTOMY  1/1/10    multiple surgeries     OPTICAL TRACKING SYSTEM ENDOSCOPIC SINUS SURGERY Right 2/15/2021    Procedure: Right image guided endoscopic maxillary antrostomy with tissue removal;  Surgeon: Naeem Chapin MD;  Location: [facility] OR     SURGICAL HISTORY OF -       sinus surgery X 3     SURGICAL HISTORY OF -       wisdom teeth extraction     SURGICAL HISTORY OF -   1/10    breaset reduction     SURGICAL HISTORY OF -   12-18-10    Right endoscopic maxillary antrostomy with removal of contents with image guidance        Family History   Problem Relation Age of Onset     C.A.D. Mother      Hypertension Mother      Lipids Mother      Heart Disease Mother      Dementia Mother      Stomach Problem Mother         Acid Reflux     Dementia Father      Stomach Problem Father         Acid Reflux     Mental Illness Paternal Grandmother      Diabetes No family hx of      Cerebrovascular Disease No family hx of      Breast Cancer No family hx of      Cancer - colorectal No family hx of         Social History     Socioeconomic History     Marital status:      Spouse name: None     Number of children: None     Years of education: None     Highest education level: None   Occupational History     None   Tobacco Use     Smoking status: Never Smoker     Smokeless tobacco: Never Used   Vaping Use     Vaping Use: Never used   Substance and Sexual Activity     Alcohol use: Yes     Comment: 1-2 drinks per month     Drug use: No     Sexual activity: Yes     Partners:  Male     Birth control/protection: Pill   Other Topics Concern     Parent/sibling w/ CABG, MI or angioplasty before 65F 55M? Not Asked   Social History Narrative     None     Social Determinants of Health     Financial Resource Strain: Not on file   Food Insecurity: Not on file   Transportation Needs: Not on file   Physical Activity: Not on file   Stress: Not on file   Social Connections: Not on file   Intimate Partner Violence: Not on file   Housing Stability: Not on file          Again, thank you for allowing me to participate in the care of your patient.      Sincerely,    Naeem Chapin MD

## 2022-04-15 ENCOUNTER — DOCUMENTATION ONLY (OUTPATIENT)
Dept: FAMILY MEDICINE | Facility: CLINIC | Age: 52
End: 2022-04-15
Payer: COMMERCIAL

## 2022-04-15 NOTE — PROGRESS NOTES
Abstract Pap through [facility] dated 6/23/2020 with associated HPV testing.    Jonnie Kenny MD  [employer]

## 2022-05-20 ENCOUNTER — TELEPHONE (OUTPATIENT)
Dept: OTOLARYNGOLOGY | Facility: CLINIC | Age: 52
End: 2022-05-20
Payer: COMMERCIAL

## 2022-05-20 NOTE — TELEPHONE ENCOUNTER
LVM that Dr. Chapin is no longer available on 6/1. Gave new date and time and call center number. Also sent out letter

## 2022-06-17 ENCOUNTER — OFFICE VISIT (OUTPATIENT)
Dept: OTOLARYNGOLOGY | Facility: CLINIC | Age: 52
End: 2022-06-17
Payer: COMMERCIAL

## 2022-06-17 VITALS
HEIGHT: 65 IN | BODY MASS INDEX: 34.11 KG/M2 | HEART RATE: 86 BPM | DIASTOLIC BLOOD PRESSURE: 68 MMHG | OXYGEN SATURATION: 97 % | TEMPERATURE: 99.5 F | SYSTOLIC BLOOD PRESSURE: 121 MMHG

## 2022-06-17 DIAGNOSIS — J32.0 CHRONIC MAXILLARY SINUSITIS: Primary | ICD-10-CM

## 2022-06-17 PROCEDURE — 31231 NASAL ENDOSCOPY DX: CPT | Performed by: OTOLARYNGOLOGY

## 2022-06-17 PROCEDURE — 99212 OFFICE O/P EST SF 10 MIN: CPT | Mod: 25 | Performed by: OTOLARYNGOLOGY

## 2022-06-17 RX ORDER — ESCITALOPRAM OXALATE 10 MG/1
5 TABLET ORAL DAILY
COMMUNITY

## 2022-06-17 ASSESSMENT — PAIN SCALES - GENERAL: PAINLEVEL: NO PAIN (0)

## 2022-06-17 NOTE — PATIENT INSTRUCTIONS
"You were seen in the clinic today by Dr. Chapin. If you have any questions or concerns after your appointment, please call the clinic at [phone]. Press \"1\" for scheduling, press \"3\" for nurse advice.    2.   Plan to return the clinic in 2 months.       Marilyn Angeles LPN  [facility]  Department of Otolaryngology  [phone]   "

## 2022-06-17 NOTE — NURSING NOTE
"Chief Complaint   Patient presents with     RECHECK     Chronic maxillary sinusitis    Blood pressure 121/68, pulse 86, temperature 99.5  F (37.5  C), temperature source Temporal, height 1.651 m (5' 5\"), SpO2 97 %, not currently breastfeeding. Marilyn Angeles LPN    "

## 2022-06-17 NOTE — LETTER
"6/17/2022       RE: Chio Bledsoe  [address] [facility] [id number]     Dear Colleague,    Thank you for referring your patient, Chio Bledsoe, to the [facility] [place] at [facility]. Please see a copy of my visit note below.      [facility]  Return Visit  Encounter date:   June 17, 2022    Chief Complaint:   Recurrent inverted papilloma    ID: recurrent RT IP of maxillary sinus  S/p revision surgery as below    Interval History:   Chio Bledsoe is a 51 year old female who presents for follow up. The patient underwent 4 papilloma removal surgeries with Dr. Gabriel, and her latest revision surgery for tumor removal earlier this year (02/15/2021) with me. She noted feeling improved at her last visit with me 6 months ago (12/01/2021).     Today, the patient reports that she is doing well.  She brings with her fluorouracil topical cream with her today.    [facility]  Procedure Date: 02/15/2021      PREOPERATIVE DIAGNOSIS:   1.  Recurrent right maxillary sinus inverted papilloma       POSTOPERATIVE DIAGNOSIS:   1.  Recurrent right maxillary sinus inverted papilloma      PROCEDURE PERFORMED:    1. Right endoscopic maxillary antrostomy with tissue removal, revision.   2. Computerized image guidance, extradural     ATTENDING SURGEON:  Naeem Chapin MD     Review of systems: A 14-point review of systems has been conducted and is negative for any notable symptoms, except as dictated in the history of present illness.     Physical Exam:  Vital signs: /68 (BP Location: Left arm, Patient Position: Sitting, Cuff Size: Adult Regular)   Pulse 86   Temp 99.5  F (37.5  C) (Temporal)   Ht 1.651 m (5' 5\")   SpO2 97%   BMI 34.11 kg/m     General Appearance: No acute distress, appropriate demeanor, conversant  Eyes: moist conjunctivae; EOMI; pupils symmetric; visual acuity grossly " intact; no proptosis  Head: normocephalic; overall symmetric appearance without deformity  Face: overall symmetric without deformity; HB I/VI  Lungs: symmetric chest rise; no wheezing  CV: Good distal perfusion; normal hear rate  Extremities: No deformity  Neurologic Exam: Cranial nerves II-XII are grossly intact; no focal deficit      Procedure Note  Procedure performed: Rigid nasal endoscopy  Indication: To evaluate for sinonasal pathology not visualized on routine anterior rhinoscopy  Anesthesia: 4% topical lidocaine with 0.05 % oxymetazoline  Description of procedure: A 30 degree, 3 mm rigid endoscope was inserted into bilateral nasal cavities and the nasal valves, nasal cavity, middle meatus, sphenoethmoid recess, nasopharynx were evaluated for evidence of obstruction, edema, purulence, polyps and/or mass/lesion.     Findings  Nicely healed joi-antrostomy.  I placed a small amount of fluorouracil cream on the anterior floor of the right maxillary sinus where there might be some early papilloma recurrence    The patient tolerated the procedure well without complication.     Laboratory Review:  n/a    Imaging Review:  n/a    Pathology Review:  n/a    Assessment/Medical Decision Making:  Recurrent RT IP of the maxillary sinus  S/p revision surgery as above    Nasal endoscopy exam today shows some small area of potential recurrence and the anterior maxillary sinus floor    Plan:  RTC in 2 months for repeat check - note small area on max sinus floor      Naeem Chapin MD    [facility]  Rhinology, Endoscopic Skull Base Surgery  [facility]  Department of Otolaryngology - Head & Neck Surgery    Scribe Disclosure:  Marilyn PULIDO, am serving as a scribe to document services personally performed by Naeem Chapin MD at this visit, based upon the provider's statements to me. All documentation has been reviewed by the aforementioned provider prior to being entered into  the official medical record.     Additional portions of the patient's history have been reviewed below.   ~~~~~~~~~~~~~~~~~~~~~~~~~~~~~~~~~~~~~~~~~~~~~~~~~~~~~~~~~~~~~~~~~~~~~~~~~~~~~~~~~~~~~~~~~~~~~~~~~~~~~~~~~~~~~~~~~~~~~~~~~~~~~~~~~~~~~~~    Past Medical History:   Diagnosis Date     Anxiety      Gastroesophageal reflux disease 1/1/16     Nasal polyps      Polyp of nasal cavity      Sleep apnea 1/1/17        Past Surgical History:   Procedure Laterality Date     NASAL/SINUS POLYPECTOMY  1/1/10    multiple surgeries     OPTICAL TRACKING SYSTEM ENDOSCOPIC SINUS SURGERY Right 2/15/2021    Procedure: Right image guided endoscopic maxillary antrostomy with tissue removal;  Surgeon: Naeem Chapin MD;  [place] [facility] [place]     SURGICAL HISTORY OF -       sinus surgery X 3     SURGICAL HISTORY OF -       wisdom teeth extraction     SURGICAL HISTORY OF -   1/10    breaset reduction     SURGICAL HISTORY OF -   12-18-10    Right endoscopic maxillary antrostomy with removal of contents with image guidance        Family History   Problem Relation Age of Onset     C.A.D. Mother      Hypertension Mother      Lipids Mother      Heart Disease Mother      Dementia Mother      Stomach Problem Mother         Acid Reflux     Dementia Father      Stomach Problem Father         Acid Reflux     Mental Illness Paternal Grandmother      Diabetes No family hx of      Cerebrovascular Disease No family hx of      Breast Cancer No family hx of      Cancer - colorectal No family hx of         Social History     Socioeconomic History     Marital status:    Tobacco Use     Smoking status: Never Smoker     Smokeless tobacco: Never Used   Vaping Use     Vaping Use: Never used   Substance and Sexual Activity     Alcohol use: Yes     Comment: 1-2 drinks per month     Drug use: No     Sexual activity: Yes     Partners: Male     Birth control/protection: Pill        Again, thank you for allowing me to participate in the care of your patient.       Sincerely,    Naeem Chapin MD

## 2022-06-17 NOTE — PROGRESS NOTES
"  [facility]  Return Visit  Encounter date:   June 17, 2022    Chief Complaint:   Recurrent inverted papilloma    ID: recurrent RT IP of maxillary sinus  S/p revision surgery as below    Interval History:   Chio Bledsoe is a 51 year old female who presents for follow up. The patient underwent 4 papilloma removal surgeries with Dr. Gabriel, and her latest revision surgery for tumor removal earlier this year (02/15/2021) with me. She noted feeling improved at her last visit with me 6 months ago (12/01/2021).     Today, the patient reports that she is doing well.  She brings with her fluorouracil topical cream with her today.    [facility] Operative [facility]  Procedure Date: 02/15/2021      PREOPERATIVE DIAGNOSIS:   1.  Recurrent right maxillary sinus inverted papilloma       POSTOPERATIVE DIAGNOSIS:   1.  Recurrent right maxillary sinus inverted papilloma      PROCEDURE PERFORMED:    1. Right endoscopic maxillary antrostomy with tissue removal, revision.   2. Computerized image guidance, extradural     ATTENDING SURGEON:  Naeem Chapin MD     Review of systems: A 14-point review of systems has been conducted and is negative for any notable symptoms, except as dictated in the history of present illness.     Physical Exam:  Vital signs: /68 (BP Location: Left arm, Patient Position: Sitting, Cuff Size: Adult Regular)   Pulse 86   Temp 99.5  F (37.5  C) (Temporal)   Ht 1.651 m (5' 5\")   SpO2 97%   BMI 34.11 kg/m     General Appearance: No acute distress, appropriate demeanor, conversant  Eyes: moist conjunctivae; EOMI; pupils symmetric; visual acuity grossly intact; no proptosis  Head: normocephalic; overall symmetric appearance without deformity  Face: overall symmetric without deformity; HB I/VI  Lungs: symmetric chest rise; no wheezing  CV: Good distal perfusion; normal hear rate  Extremities: No deformity  Neurologic Exam: Cranial nerves II-XII are grossly intact; no focal " deficit      Procedure Note  Procedure performed: Rigid nasal endoscopy  Indication: To evaluate for sinonasal pathology not visualized on routine anterior rhinoscopy  Anesthesia: 4% topical lidocaine with 0.05 % oxymetazoline  Description of procedure: A 30 degree, 3 mm rigid endoscope was inserted into bilateral nasal cavities and the nasal valves, nasal cavity, middle meatus, sphenoethmoid recess, nasopharynx were evaluated for evidence of obstruction, edema, purulence, polyps and/or mass/lesion.     Findings  Nicely healed joi-antrostomy.  I placed a small amount of fluorouracil cream on the anterior floor of the right maxillary sinus where there might be some early papilloma recurrence    The patient tolerated the procedure well without complication.     Laboratory Review:  n/a    Imaging Review:  n/a    Pathology Review:  n/a    Assessment/Medical Decision Making:  Recurrent RT IP of the maxillary sinus  S/p revision surgery as above    Nasal endoscopy exam today shows some small area of potential recurrence and the anterior maxillary sinus floor    Plan:  RTC in 2 months for repeat check - note small area on max sinus floor      Naeem Chapin MD    [facility]  Rhinology, Endoscopic Skull Base Surgery  [facility]  Department of Otolaryngology - Head & Neck Surgery    Scribe Disclosure:  I, Marilyn Clarke, am serving as a scribe to document services personally performed by Naeem Chapin MD at this visit, based upon the provider's statements to me. All documentation has been reviewed by the aforementioned provider prior to being entered into the official medical record.     Additional portions of the patient's history have been reviewed below.   ~~~~~~~~~~~~~~~~~~~~~~~~~~~~~~~~~~~~~~~~~~~~~~~~~~~~~~~~~~~~~~~~~~~~~~~~~~~~~~~~~~~~~~~~~~~~~~~~~~~~~~~~~~~~~~~~~~~~~~~~~~~~~~~~~~~~~~~    Past Medical History:   Diagnosis Date     Anxiety      Gastroesophageal reflux  disease 1/1/16     Nasal polyps      Polyp of nasal cavity      Sleep apnea 1/1/17        Past Surgical History:   Procedure Laterality Date     NASAL/SINUS POLYPECTOMY  1/1/10    multiple surgeries     OPTICAL TRACKING SYSTEM ENDOSCOPIC SINUS SURGERY Right 2/15/2021    Procedure: Right image guided endoscopic maxillary antrostomy with tissue removal;  Surgeon: Naeem Chapin MD;  Location: [facility] OR     SURGICAL HISTORY OF -       sinus surgery X 3     SURGICAL HISTORY OF -       wisdom teeth extraction     SURGICAL HISTORY OF -   1/10    breaset reduction     SURGICAL HISTORY OF -   12-18-10    Right endoscopic maxillary antrostomy with removal of contents with image guidance        Family History   Problem Relation Age of Onset     C.A.D. Mother      Hypertension Mother      Lipids Mother      Heart Disease Mother      Dementia Mother      Stomach Problem Mother         Acid Reflux     Dementia Father      Stomach Problem Father         Acid Reflux     Mental Illness Paternal Grandmother      Diabetes No family hx of      Cerebrovascular Disease No family hx of      Breast Cancer No family hx of      Cancer - colorectal No family hx of         Social History     Socioeconomic History     Marital status:    Tobacco Use     Smoking status: Never Smoker     Smokeless tobacco: Never Used   Vaping Use     Vaping Use: Never used   Substance and Sexual Activity     Alcohol use: Yes     Comment: 1-2 drinks per month     Drug use: No     Sexual activity: Yes     Partners: Male     Birth control/protection: Pill

## 2022-08-19 ENCOUNTER — OFFICE VISIT (OUTPATIENT)
Dept: OTOLARYNGOLOGY | Facility: CLINIC | Age: 52
End: 2022-08-19
Payer: COMMERCIAL

## 2022-08-19 VITALS
HEIGHT: 65 IN | HEART RATE: 104 BPM | OXYGEN SATURATION: 98 % | DIASTOLIC BLOOD PRESSURE: 91 MMHG | BODY MASS INDEX: 34.11 KG/M2 | SYSTOLIC BLOOD PRESSURE: 128 MMHG

## 2022-08-19 DIAGNOSIS — J32.0 CHRONIC MAXILLARY SINUSITIS: ICD-10-CM

## 2022-08-19 DIAGNOSIS — D14.0 INVERTED PAPILLOMA OF MAXILLARY SINUS: Primary | ICD-10-CM

## 2022-08-19 PROCEDURE — 99207 PR CDG-PROCEDURE CHARGE ONLY: CPT | Performed by: OTOLARYNGOLOGY

## 2022-08-19 PROCEDURE — 31231 NASAL ENDOSCOPY DX: CPT | Performed by: OTOLARYNGOLOGY

## 2022-08-19 RX ORDER — TRAZODONE HYDROCHLORIDE 50 MG/1
50 TABLET, FILM COATED ORAL
COMMUNITY

## 2022-08-19 ASSESSMENT — PAIN SCALES - GENERAL: PAINLEVEL: NO PAIN (0)

## 2022-08-19 NOTE — PROGRESS NOTES
"  [facility]  Return Visit  Encounter date:   August 19, 2022    Chief Complaint:   Recurrent inverted papilloma     ID: recurrent RT IP of maxillary sinus  S/p revision surgery as below    Interval History:   Chio Bledsoe is a 51 year old female who presents for follow up. The patient underwent 4 papilloma removal surgeries with Dr. Gabriel, and her latest revision surgery for tumor removal earlier this year (02/15/2021) with me. At our visit on 12/1/21, she was feeling improved. During our most recent appointment on 6/17/22, she had continued to do well.    Today, she is doing well. She brought her fluorouracil topical cream with her today.    Sino-Nasal Outcome Test (SNOT - 22)  [facility] Operative History  Procedure Date: 02/15/2021      PREOPERATIVE DIAGNOSIS:   1.  Recurrent right maxillary sinus inverted papilloma       POSTOPERATIVE DIAGNOSIS:   1.  Recurrent right maxillary sinus inverted papilloma      PROCEDURE PERFORMED:    1. Right endoscopic maxillary antrostomy with tissue removal, revision.   2. Computerized image guidance, extradural     ATTENDING SURGEON:  Naeem Chapin MD     Review of systems: A 14-point review of systems has been conducted and is negative for any notable symptoms, except as dictated in the history of present illness.     Physical Exam:  Vital signs: BP (!) 128/91 (BP Location: Right arm, Patient Position: Sitting, Cuff Size: Adult Regular)   Pulse 104   Ht 1.651 m (5' 5\")   SpO2 98%   BMI 34.11 kg/m     General Appearance: No acute distress, appropriate demeanor, conversant  Eyes: moist conjunctivae; EOMI; pupils symmetric; visual acuity grossly intact; no proptosis  Head: normocephalic; overall symmetric appearance without deformity  Face: overall symmetric without deformity; HB I/VI  Nose: No external deformity; see endoscopy  Lungs: symmetric chest rise; no wheezing  CV: Good distal perfusion; normal hear rate  Extremities: No " deformity  Neurologic Exam: Cranial nerves II-XII are grossly intact; no focal deficit    Procedure Note  Procedure performed: Rigid nasal endoscopy  Indication: To evaluate for sinonasal pathology not visualized on routine anterior rhinoscopy  Anesthesia: 4% topical lidocaine with 0.05 % oxymetazoline  Description of procedure: A 30 degree, 3 mm rigid endoscope was inserted into bilateral nasal cavities and the nasal valves, nasal cavity, middle meatus, sphenoethmoid recess, nasopharynx were evaluated for evidence of obstruction, edema, purulence, polyps and/or mass/lesion.     Findings  Nicely healed joi-antrostomy.  I placed a small amount of fluorouracil cream on the anterior floor of the right maxillary sinus where there might be early papilloma recurrence.    The patient tolerated the procedure well without complication.     Laboratory Review:  N/A     Imaging Review:  N/A     Pathology Review:  N/A     Assessment/Medical Decision Making:  Recurrent RT IP of the maxillary sinus  S/p revision surgery as above    Plan:  Chio Bledsoe is a 51 year old female who presents for follow up. Bilateral endoscopy is reassuring aside from a small area of potential recurrence on the anterior maxillary sinus floor. As such, I would like her to follow up in 2 months.     Naeem Chapin MD    [facility]  Rhinology, Endoscopic Skull Base Surgery  [facility]  Department of Otolaryngology - Head & Neck Surgery    Scribe Disclosure:  ICa, am serving as a scribe to document services personally performed by Naeem Chapin MD at this visit, based upon the provider's statements to me. All documentation has been reviewed by the aforementioned provider prior to being entered into the official medical record.     Additional portions of the patient's history have been reviewed below.    ~~~~~~~~~~~~~~~~~~~~~~~~~~~~~~~~~~~~~~~~~~~~~~~~~~~~~~~~~~~~~~~~~~~~~~~~~~~~~~~~~~~~~~~~~~~~~~~~~~~~~~~~~~~~~~~~~~~~~~~~~~~~~~~~~~~~~~~    Past Medical History:   Diagnosis Date     Anxiety      Gastroesophageal reflux disease 1/1/16     Nasal polyps      Polyp of nasal cavity      Sleep apnea 1/1/17        Past Surgical History:   Procedure Laterality Date     NASAL/SINUS POLYPECTOMY  1/1/10    multiple surgeries     OPTICAL TRACKING SYSTEM ENDOSCOPIC SINUS SURGERY Right 2/15/2021    Procedure: Right image guided endoscopic maxillary antrostomy with tissue removal;  Surgeon: Naeem Chapin MD;  [place] [facility] [place]     SURGICAL HISTORY OF -       sinus surgery X 3     SURGICAL HISTORY OF -       wisdom teeth extraction     SURGICAL HISTORY OF -   1/10    breaset reduction     SURGICAL HISTORY OF -   12-18-10    Right endoscopic maxillary antrostomy with removal of contents with image guidance        Family History   Problem Relation Age of Onset     C.A.D. Mother      Hypertension Mother      Lipids Mother      Heart Disease Mother      Dementia Mother      Stomach Problem Mother         Acid Reflux     Dementia Father      Stomach Problem Father         Acid Reflux     Mental Illness Paternal Grandmother      Diabetes No family hx of      Cerebrovascular Disease No family hx of      Breast Cancer No family hx of      Cancer - colorectal No family hx of         Social History     Socioeconomic History     Marital status:    Tobacco Use     Smoking status: Never Smoker     Smokeless tobacco: Never Used   Vaping Use     Vaping Use: Never used   Substance and Sexual Activity     Alcohol use: Yes     Comment: 1-2 drinks per month     Drug use: No     Sexual activity: Yes     Partners: Male     Birth control/protection: Pill

## 2022-08-19 NOTE — LETTER
"8/19/2022       RE: Chio Bledsoe  [address] [facility] [id number]     Dear Colleague,    Thank you for referring your patient, Chio Bledsoe, to the [facility] THRO[facility] CLINIC [place] at [facility]. Please see a copy of my visit note below.      [facility]  Return Visit  Encounter date:   August 19, 2022    Chief Complaint:   Recurrent inverted papilloma     ID: recurrent RT IP of maxillary sinus  S/p revision surgery as below    Interval History:   Chio Bledsoe is a 51 year old female who presents for follow up. The patient underwent 4 papilloma removal surgeries with Dr. Gabriel, and her latest revision surgery for tumor removal earlier this year (02/15/2021) with me. At our visit on 12/1/21, she was feeling improved. During our most recent appointment on 6/17/22, she had continued to do well.    Today, she is doing well. She brought her fluorouracil topical cream with her today.    Sino-Nasal Outcome Test (SNOT - 22)  [facility] Operative History  Procedure Date: 02/15/2021      PREOPERATIVE DIAGNOSIS:   1.  Recurrent right maxillary sinus inverted papilloma       POSTOPERATIVE DIAGNOSIS:   1.  Recurrent right maxillary sinus inverted papilloma      PROCEDURE PERFORMED:    1. Right endoscopic maxillary antrostomy with tissue removal, revision.   2. Computerized image guidance, extradural     ATTENDING SURGEON:  Naeem Chapin MD     Review of systems: A 14-point review of systems has been conducted and is negative for any notable symptoms, except as dictated in the history of present illness.     Physical Exam:  Vital signs: BP (!) 128/91 (BP Location: Right arm, Patient Position: Sitting, Cuff Size: Adult Regular)   Pulse 104   Ht 1.651 m (5' 5\")   SpO2 98%   BMI 34.11 kg/m     General Appearance: No acute distress, appropriate demeanor, conversant  Eyes: moist conjunctivae; EOMI; pupils " symmetric; visual acuity grossly intact; no proptosis  Head: normocephalic; overall symmetric appearance without deformity  Face: overall symmetric without deformity; HB I/VI  Nose: No external deformity; see endoscopy  Lungs: symmetric chest rise; no wheezing  CV: Good distal perfusion; normal hear rate  Extremities: No deformity  Neurologic Exam: Cranial nerves II-XII are grossly intact; no focal deficit    Procedure Note  Procedure performed: Rigid nasal endoscopy  Indication: To evaluate for sinonasal pathology not visualized on routine anterior rhinoscopy  Anesthesia: 4% topical lidocaine with 0.05 % oxymetazoline  Description of procedure: A 30 degree, 3 mm rigid endoscope was inserted into bilateral nasal cavities and the nasal valves, nasal cavity, middle meatus, sphenoethmoid recess, nasopharynx were evaluated for evidence of obstruction, edema, purulence, polyps and/or mass/lesion.     Findings  Nicely healed joi-antrostomy.  I placed a small amount of fluorouracil cream on the anterior floor of the right maxillary sinus where there might be early papilloma recurrence.    The patient tolerated the procedure well without complication.     Laboratory Review:  N/A     Imaging Review:  N/A     Pathology Review:  N/A     Assessment/Medical Decision Making:  Recurrent RT IP of the maxillary sinus  S/p revision surgery as above    Plan:  Chio Bledsoe is a 51 year old female who presents for follow up. Bilateral endoscopy is reassuring aside from a small area of potential recurrence on the anterior maxillary sinus floor. As such, I would like her to follow up in 2 months.     Naeem Chapin MD    [facility]  Rhinology, Endoscopic Skull Base Surgery  [facility]  Department of Otolaryngology - Head & Neck Surgery    Scribe Disclosure:  ICa, am serving as a scribe to document services personally performed by Naeem Chapin MD at this visit,  based upon the provider's statements to me. All documentation has been reviewed by the aforementioned provider prior to being entered into the official medical record.     Additional portions of the patient's history have been reviewed below.   ~~~~~~~~~~~~~~~~~~~~~~~~~~~~~~~~~~~~~~~~~~~~~~~~~~~~~~~~~~~~~~~~~~~~~~~~~~~~~~~~~~~~~~~~~~~~~~~~~~~~~~~~~~~~~~~~~~~~~~~~~~~~~~~~~~~~~~~    Past Medical History:   Diagnosis Date     Anxiety      Gastroesophageal reflux disease 1/1/16     Nasal polyps      Polyp of nasal cavity      Sleep apnea 1/1/17        Past Surgical History:   Procedure Laterality Date     NASAL/SINUS POLYPECTOMY  1/1/10    multiple surgeries     OPTICAL TRACKING SYSTEM ENDOSCOPIC SINUS SURGERY Right 2/15/2021    Procedure: Right image guided endoscopic maxillary antrostomy with tissue removal;  Surgeon: Naeem Chapin MD;  Location: [facility] [place]     SURGICAL HISTORY OF -       sinus surgery X 3     SURGICAL HISTORY OF -       wisdom teeth extraction     SURGICAL HISTORY OF -   1/10    breaset reduction     SURGICAL HISTORY OF -   12-18-10    Right endoscopic maxillary antrostomy with removal of contents with image guidance        Family History   Problem Relation Age of Onset     C.A.D. Mother      Hypertension Mother      Lipids Mother      Heart Disease Mother      Dementia Mother      Stomach Problem Mother         Acid Reflux     Dementia Father      Stomach Problem Father         Acid Reflux     Mental Illness Paternal Grandmother      Diabetes No family hx of      Cerebrovascular Disease No family hx of      Breast Cancer No family hx of      Cancer - colorectal No family hx of         Social History     Socioeconomic History     Marital status:    Tobacco Use     Smoking status: Never Smoker     Smokeless tobacco: Never Used   Vaping Use     Vaping Use: Never used   Substance and Sexual Activity     Alcohol use: Yes     Comment: 1-2 drinks per month     Drug use: No     Sexual activity: Yes      Partners: Male     Birth control/protection: Pill            Again, thank you for allowing me to participate in the care of your patient.      Sincerely,    Naeem Chapin MD

## 2022-10-23 ENCOUNTER — HEALTH MAINTENANCE LETTER (OUTPATIENT)
Age: 52
End: 2022-10-23

## 2022-12-16 ENCOUNTER — OFFICE VISIT (OUTPATIENT)
Dept: OTOLARYNGOLOGY | Facility: CLINIC | Age: 52
End: 2022-12-16
Payer: COMMERCIAL

## 2022-12-16 VITALS
RESPIRATION RATE: 16 BRPM | HEART RATE: 92 BPM | SYSTOLIC BLOOD PRESSURE: 129 MMHG | DIASTOLIC BLOOD PRESSURE: 73 MMHG | OXYGEN SATURATION: 97 % | TEMPERATURE: 96.8 F

## 2022-12-16 DIAGNOSIS — D14.0 INVERTED PAPILLOMA OF MAXILLARY SINUS: Primary | ICD-10-CM

## 2022-12-16 DIAGNOSIS — J32.0 CHRONIC MAXILLARY SINUSITIS: ICD-10-CM

## 2022-12-16 PROCEDURE — 99207 PR CDG-PROCEDURE CHARGE ONLY: CPT | Performed by: OTOLARYNGOLOGY

## 2022-12-16 PROCEDURE — 31231 NASAL ENDOSCOPY DX: CPT | Performed by: OTOLARYNGOLOGY

## 2022-12-16 ASSESSMENT — PAIN SCALES - GENERAL: PAINLEVEL: NO PAIN (0)

## 2022-12-16 NOTE — LETTER
12/16/2022       RE: Chio Bledsoe  1987 [facility] [id number]     Dear Colleague,    Thank you for referring your patient, Chio Bledsoe, to the [facility] E[facility] NO[facility] AND [facility] [place] at [facility]. Please see a copy of my visit note below.      [facility]  Return Visit  Encounter date:   December 16, 2022    Chief Complaint:   Recurrent inverted papilloma    ID:   Recurrent RT IP of maxillary sinus  S/p revision surgery as below    Interval History:   Chio Bledsoe is a 52 year old female who presents for follow up. The patient underwent 4 papilloma removal surgeries with Dr. Gabriel, and her latest revision surgery for tumor removal earlier this year (02/15/2021) with me. She was doing well at our last visit (8/19/22).     Today, she reports no new symptoms.    Sino-Nasal Outcome Test (SNOT - 22)  [facility] Operative History  Procedure Date: 02/15/2021      PREOPERATIVE DIAGNOSIS:   1.  Recurrent right maxillary sinus inverted papilloma       POSTOPERATIVE DIAGNOSIS:   1.  Recurrent right maxillary sinus inverted papilloma      PROCEDURE PERFORMED:    1. Right endoscopic maxillary antrostomy with tissue removal, revision.   2. Computerized image guidance, extradural     ATTENDING SURGEON:  Naeem Chapin MD     Review of systems: A 14-point review of systems has been conducted and is negative for any notable symptoms, except as dictated in the history of present illness.     Physical Exam:  Vital signs: There were no vitals taken for this visit.   General Appearance: No acute distress, appropriate demeanor, conversant  Eyes: moist conjunctivae; EOMI; pupils symmetric; visual acuity grossly intact; no proptosis  Head: normocephalic; overall symmetric appearance without deformity  Face: overall symmetric without deformity; HB I/VI  Ears: Normal appearance of external ear; external meatus normal in  appearance; TMs intact without perforation bilaterally;   Nose: No external deformity; see nasal endoscopy    Procedure Note  Procedure performed: Rigid nasal endoscopy with topical placement of 5-Fluorouracil   Indication: To evaluate for sinonasal pathology not visualized on routine anterior rhinoscopy  Anesthesia: 4% topical lidocaine with 0.05 % oxymetazoline  Description of procedure: A 30 degree, 3 mm rigid endoscope was inserted into bilateral nasal cavities and the nasal valves, nasal cavity, middle meatus, sphenoethmoid recess, nasopharynx were evaluated for evidence of obstruction, edema, purulence, polyps and/or mass/lesion.       Findings  Small stable-sized inverted papilloma of anterior nasal cavity/maxillary sinus wall  5- fluorouracil cream placed without issue    The patient tolerated the procedure well without complication.     Laboratory Review:  N/A     Imaging Review:  N/A     Pathology Review:  N/A     Assessment/Medical Decision Making:  Recurrent RT IP of the maxillary sinus  S/p revision surgery as above    Plan:  Stable IP of max sinus  5-fluorouracil placed today   Will likely take a break from topical application for now, given stability   RTC in 2-3 months    Naeem Chapin MD    [facility]  Rhinology, Endoscopic Skull Base Surgery  [facility]  Department of Otolaryngology - Head & Neck Surgery    Scribe Disclosure:  I, Ca Wu, am serving as a scribe to document services personally performed by Naeem Chapin MD at this visit, based upon the provider's statements to me. All documentation has been reviewed by the aforementioned provider prior to being entered into the official medical record.     Additional portions of the patient's history have been reviewed below.   ~~~~~~~~~~~~~~~~~~~~~~~~~~~~~~~~~~~~~~~~~~~~~~~~~~~~~~~~~~~~~~~~~~~~~~~~~~~~~~~~~~~~~~~~~~~~~~~~~~~~~~~~~~~~~~~~~~~~~~~~~~~~~~~~~~~~~~~    Past Medical History:    Diagnosis Date     Anxiety      Gastroesophageal reflux disease 1/1/16     Nasal polyps      Polyp of nasal cavity      Sleep apnea 1/1/17        Past Surgical History:   Procedure Laterality Date     NASAL/SINUS POLYPECTOMY  1/1/10    multiple surgeries     OPTICAL TRACKING SYSTEM ENDOSCOPIC SINUS SURGERY Right 2/15/2021    Procedure: Right image guided endoscopic maxillary antrostomy with tissue removal;  Surgeon: Naeem Chapin MD;  Location: [facility] OR     SURGICAL HISTORY OF -       sinus surgery X 3     SURGICAL HISTORY OF -       wisdom teeth extraction     SURGICAL HISTORY OF -   1/10    breaset reduction     SURGICAL HISTORY OF -   12-18-10    Right endoscopic maxillary antrostomy with removal of contents with image guidance        Family History   Problem Relation Age of Onset     C.A.D. Mother      Hypertension Mother      Lipids Mother      Heart Disease Mother      Dementia Mother      Stomach Problem Mother         Acid Reflux     Dementia Father      Stomach Problem Father         Acid Reflux     Mental Illness Paternal Grandmother      Diabetes No family hx of      Cerebrovascular Disease No family hx of      Breast Cancer No family hx of      Cancer - colorectal No family hx of         Social History     Socioeconomic History     Marital status:    Tobacco Use     Smoking status: Never     Smokeless tobacco: Never   Vaping Use     Vaping Use: Never used   Substance and Sexual Activity     Alcohol use: Yes     Comment: 1-2 drinks per month     Drug use: No     Sexual activity: Yes     Partners: Male     Birth control/protection: Pill      Again, thank you for allowing me to participate in the care of your patient.      Sincerely,    Naeem Chapin MD

## 2022-12-16 NOTE — PROGRESS NOTES
[facility]  Return Visit  Encounter date:   December 16, 2022    Chief Complaint:   Recurrent inverted papilloma    ID:   Recurrent RT IP of maxillary sinus  S/p revision surgery as below    Interval History:   Chio Bledsoe is a 52 year old female who presents for follow up. The patient underwent 4 papilloma removal surgeries with Dr. Gabriel, and her latest revision surgery for tumor removal earlier this year (02/15/2021) with me. She was doing well at our last visit (8/19/22).     Today, she reports no new symptoms.    Sino-Nasal Outcome Test (SNOT - 22)  [facility] Operative History  Procedure Date: 02/15/2021      PREOPERATIVE DIAGNOSIS:   1.  Recurrent right maxillary sinus inverted papilloma       POSTOPERATIVE DIAGNOSIS:   1.  Recurrent right maxillary sinus inverted papilloma      PROCEDURE PERFORMED:    1. Right endoscopic maxillary antrostomy with tissue removal, revision.   2. Computerized image guidance, extradural     ATTENDING SURGEON:  Naeem Chapin MD     Review of systems: A 14-point review of systems has been conducted and is negative for any notable symptoms, except as dictated in the history of present illness.     Physical Exam:  Vital signs: There were no vitals taken for this visit.   General Appearance: No acute distress, appropriate demeanor, conversant  Eyes: moist conjunctivae; EOMI; pupils symmetric; visual acuity grossly intact; no proptosis  Head: normocephalic; overall symmetric appearance without deformity  Face: overall symmetric without deformity; HB I/VI  Ears: Normal appearance of external ear; external meatus normal in appearance; TMs intact without perforation bilaterally;   Nose: No external deformity; see nasal endoscopy    Procedure Note  Procedure performed: Rigid nasal endoscopy with topical placement of 5-Fluorouracil   Indication: To evaluate for sinonasal pathology not visualized on routine anterior rhinoscopy  Anesthesia: 4% topical  lidocaine with 0.05 % oxymetazoline  Description of procedure: A 30 degree, 3 mm rigid endoscope was inserted into bilateral nasal cavities and the nasal valves, nasal cavity, middle meatus, sphenoethmoid recess, nasopharynx were evaluated for evidence of obstruction, edema, purulence, polyps and/or mass/lesion.       Findings  Small stable-sized inverted papilloma of anterior nasal cavity/maxillary sinus wall  5- fluorouracil cream placed without issue    The patient tolerated the procedure well without complication.     Laboratory Review:  N/A     Imaging Review:  N/A     Pathology Review:  N/A     Assessment/Medical Decision Making:  Recurrent RT IP of the maxillary sinus  S/p revision surgery as above    Plan:  Stable IP of max sinus  5-fluorouracil placed today   Will likely take a break from topical application for now, given stability   RTC in 2-3 months    Naeem Chapin MD    [facility]  Rhinology, Endoscopic Skull Base Surgery  [facility]  Department of Otolaryngology - Head & Neck Surgery    Scribe Disclosure:  I, Ca Wu, am serving as a scribe to document services personally performed by Naeem Chapin MD at this visit, based upon the provider's statements to me. All documentation has been reviewed by the aforementioned provider prior to being entered into the official medical record.     Additional portions of the patient's history have been reviewed below.   ~~~~~~~~~~~~~~~~~~~~~~~~~~~~~~~~~~~~~~~~~~~~~~~~~~~~~~~~~~~~~~~~~~~~~~~~~~~~~~~~~~~~~~~~~~~~~~~~~~~~~~~~~~~~~~~~~~~~~~~~~~~~~~~~~~~~~~~    Past Medical History:   Diagnosis Date     Anxiety      Gastroesophageal reflux disease 1/1/16     Nasal polyps      Polyp of nasal cavity      Sleep apnea 1/1/17        Past Surgical History:   Procedure Laterality Date     NASAL/SINUS POLYPECTOMY  1/1/10    multiple surgeries     OPTICAL TRACKING SYSTEM ENDOSCOPIC SINUS SURGERY Right 2/15/2021     Procedure: Right image guided endoscopic maxillary antrostomy with tissue removal;  Surgeon: Naeem Chapin MD;  Location: [facility] OR     SURGICAL HISTORY OF -       sinus surgery X 3     SURGICAL HISTORY OF -       wisdom teeth extraction     SURGICAL HISTORY OF -   1/10    breaset reduction     SURGICAL HISTORY OF -   12-18-10    Right endoscopic maxillary antrostomy with removal of contents with image guidance        Family History   Problem Relation Age of Onset     C.A.D. Mother      Hypertension Mother      Lipids Mother      Heart Disease Mother      Dementia Mother      Stomach Problem Mother         Acid Reflux     Dementia Father      Stomach Problem Father         Acid Reflux     Mental Illness Paternal Grandmother      Diabetes No family hx of      Cerebrovascular Disease No family hx of      Breast Cancer No family hx of      Cancer - colorectal No family hx of         Social History     Socioeconomic History     Marital status:    Tobacco Use     Smoking status: Never     Smokeless tobacco: Never   Vaping Use     Vaping Use: Never used   Substance and Sexual Activity     Alcohol use: Yes     Comment: 1-2 drinks per month     Drug use: No     Sexual activity: Yes     Partners: Male     Birth control/protection: Pill

## 2022-12-16 NOTE — PATIENT INSTRUCTIONS
You were seen in the ENT Clinic today by Dr. Chapin. If you have any questions or concerns after your appointment, please contact us (see below)      2.   Please return to the ENT clinic in 3 months.           How to Contact Us:  Send a Boomset message to your provider. Our team will respond to you via Boomset. Occasionally, we will need to call you to get further information.  For urgent matters (Monday-Friday), call the ENT Clinic: [phone] and speak with a call center team member - they will route your call appropriately.   If you'd like to speak directly with a nurse, please find our contact information below. We do our best to check voicemail frequently throughout the day, and will work to call you back within 1-2 days. For urgent matters, please use the general clinic phone numbers listed above.        Ellie JIANG RN  ENT RN Care Coordinator  Direct: [phone]  Marilyn RAYMOND LPN  Direct: [phone]         [facility]  Department of Otolaryngology

## 2023-01-23 ENCOUNTER — LAB REQUISITION (OUTPATIENT)
Dept: LAB | Facility: CLINIC | Age: 53
End: 2023-01-23

## 2023-01-23 DIAGNOSIS — Z13.220 ENCOUNTER FOR SCREENING FOR LIPOID DISORDERS: ICD-10-CM

## 2023-01-23 LAB
CHOLEST SERPL-MCNC: 219 MG/DL
HDLC SERPL-MCNC: 48 MG/DL
LDLC SERPL CALC-MCNC: 127 MG/DL
NONHDLC SERPL-MCNC: 171 MG/DL
TRIGL SERPL-MCNC: 219 MG/DL

## 2023-01-23 PROCEDURE — 80061 LIPID PANEL: CPT | Performed by: PHYSICIAN ASSISTANT

## 2023-03-22 ENCOUNTER — TELEPHONE (OUTPATIENT)
Dept: OTOLARYNGOLOGY | Facility: CLINIC | Age: 53
End: 2023-03-22
Payer: COMMERCIAL

## 2023-03-22 NOTE — TELEPHONE ENCOUNTER
This writer called patient back to offer a follow up appointment with Dr. Chapin on April 3rd or April 17th.    Provided the patient this writer's direct call back number to reschedule the appointment that needed to be rescheduled due to provider illness on March 10th.    RAGINI NAIR LPN on 3/22/2023 at 1:24 PM

## 2023-03-27 NOTE — TELEPHONE ENCOUNTER
Patient called back regarding cancelled appointment on 3/10/2023 due to provider illness. Patient LVM for this writer.    This writer called patient back and offered 4/17/2023 at 11:30 am. Patient in agreement with date and time.    Patient scheduled accordingly.    Nothing further needed at this time.    RAGINI NAIR, LPN on 3/27/2023 at 9:38 AM

## 2023-04-02 ENCOUNTER — HEALTH MAINTENANCE LETTER (OUTPATIENT)
Age: 53
End: 2023-04-02

## 2023-04-14 NOTE — PROGRESS NOTES
"  [facility]  Return Visit  Encounter date:   April 17, 2023    Chief Complaint:   Follow-up     ID:  Recurrent RT IP of maxillary sinus   S/p revision surgery as below    Interval History:   Chio Bledsoe is a 52 year old female who presents for follow up. The patient underwent 4 papilloma removal surgeries with Dr. Gabriel, and her latest revision surgery for tumor removal earlier this year (02/15/2021) with me. At our last visit (12/16/22) she had a stable exam with no new symptoms and had 5-fluorouracil placed.     Today she tells me that she is doing well and both of her children are graduating this year. She has not received gardasil vaccine.     Sino-Nasal Outcome Test (SNOT - 22)  [facility] Operative History  Procedure Date: 02/15/2021      PREOPERATIVE DIAGNOSIS:   1.  Recurrent right maxillary sinus inverted papilloma       POSTOPERATIVE DIAGNOSIS:   1.  Recurrent right maxillary sinus inverted papilloma      PROCEDURE PERFORMED:    1. Right endoscopic maxillary antrostomy with tissue removal, revision.   2. Computerized image guidance, extradural     ATTENDING SURGEON:  Naeem Chapin MD     Review of systems: A 14-point review of systems has been conducted and is negative for any notable symptoms, except as dictated in the history of present illness.     Physical Exam:  Vital signs: /77 (BP Location: Left arm, Patient Position: Sitting, Cuff Size: Adult Regular)   Pulse 73   Ht 1.651 m (5' 5\")   Wt 90.7 kg (200 lb)   BMI 33.28 kg/m     General Appearance: No acute distress, appropriate demeanor, conversant  Eyes: moist conjunctivae; EOMI; pupils symmetric; visual acuity grossly intact; no proptosis  Head: normocephalic; overall symmetric appearance without deformity  Face: overall symmetric without deformity; HB I/VI  Nose: No external deformity; see endoscopy  Lungs: symmetric chest rise; no wheezing  CV: Good distal perfusion; normal hear rate  Extremities: No " deformity  Neurologic Exam: Cranial nerves II-XII are grossly intact; no focal deficit       Procedure Note  Procedure performed: Rigid nasal endoscopy  Indication: To evaluate for sinonasal pathology not visualized on routine anterior rhinoscopy  Anesthesia: 4% topical lidocaine with 0.05 % oxymetazoline  Description of procedure: A 70 degree, 3 mm rigid endoscope was inserted into bilateral nasal cavities and the nasal valves, nasal cavity, middle meatus, sphenoethmoid recess, nasopharynx were evaluated for evidence of obstruction, edema, purulence, polyps and/or mass/lesion.     Mildred-Dedrick Endoscopic Scoring System  Endoscopic observation Right Left   Polyps in middle meatus (0 = absent, 1 = restricted to middle meatus, 2 = Beyond middle meatus) 0    Discharge (0 = absent, 1 = thin and clear, 2 = thick, purulent) 0    Edema (0 = absent, 1 = mild-moderate, 2 = moderate-severe) 0    Crusting (0 = absent, 1 = mild-moderate, 2 = moderate-severe) 0    Scarring (0= absent, 1 = mild-moderate, 2 = moderate-severe) 0    Total 0      Findings  RT: stable small papilloma on right anterior floor of maxillary sinus, questionable focus along anterior maxillary sinus wall     The patient tolerated the procedure well without complication.     Laboratory Review:  N/A     Imaging Review:  N/A     Pathology Review:  N/A     Assessment/Medical Decision Making:  Recurrent RT IP of the maxillary sinus  S/p revision surgery as above      Right sided endoscopy today - stable IP of the maxillary sinus    Given stability I did not apply 5-fluorouracil today and feel we can take a break on this as long as she remains stable but will monitor closely. We discussed nature of her inverted papilloma and I recommend she obtain the gardasil vaccine.     I briefly discussed expectations for surgery should she have progression of the IP whether benign or malignant although not indicated at this time.     Plan:  Obtain gardasil vaccine   RTC in  3-4 months      Naeem Chapin MD    [facility]  Rhinology, Endoscopic Skull Base Surgery  [facility]  Department of Otolaryngology - Head & Neck Surgery    Scribe Disclosure:  I, Helen Joe, am serving as a scribe to document services personally performed by Naeem Chapin MD at this visit, based upon the provider's statements to me. All documentation has been reviewed by the aforementioned provider prior to being entered into the official medical record.     Additional portions of the patient's history have been reviewed below.   ~~~~~~~~~~~~~~~~~~~~~~~~~~~~~~~~~~~~~~~~~~~~~~~~~~~~~~~~~~~~~~~~~~~~~~~~~~~~~~~~~~~~~~~~~~~~~~~~~~~~~~~~~~~~~~~~~~~~~~~~~~~~~~~~~~~~~~~    Past Medical History:   Diagnosis Date     Anxiety      Gastroesophageal reflux disease 1/1/16     Nasal polyps      Polyp of nasal cavity      Sleep apnea 1/1/17        Past Surgical History:   Procedure Laterality Date     NASAL/SINUS POLYPECTOMY  1/1/10    multiple surgeries     OPTICAL TRACKING SYSTEM ENDOSCOPIC SINUS SURGERY Right 2/15/2021    Procedure: Right image guided endoscopic maxillary antrostomy with tissue removal;  Surgeon: Naeem Chapin MD;  [place] [facility]     SURGICAL HISTORY OF -       sinus surgery X 3     SURGICAL HISTORY OF -       wisdom teeth extraction     SURGICAL HISTORY OF -   1/10    breaset reduction     SURGICAL HISTORY OF -   12-18-10    Right endoscopic maxillary antrostomy with removal of contents with image guidance        Family History   Problem Relation Age of Onset     C.A.D. Mother      Hypertension Mother      Lipids Mother      Heart Disease Mother      Dementia Mother      Stomach Problem Mother         Acid Reflux     Dementia Father      Stomach Problem Father         Acid Reflux     Mental Illness Paternal Grandmother      Diabetes No family hx of      Cerebrovascular Disease No family hx of      Breast Cancer No family hx of      Cancer -  colorectal No family hx of         Social History     Socioeconomic History     Marital status:    Tobacco Use     Smoking status: Never     Smokeless tobacco: Never   Vaping Use     Vaping status: Never Used   Substance and Sexual Activity     Alcohol use: Yes     Comment: 1-2 drinks per month     Drug use: No     Sexual activity: Yes     Partners: Male     Birth control/protection: Pill

## 2023-04-17 ENCOUNTER — OFFICE VISIT (OUTPATIENT)
Dept: OTOLARYNGOLOGY | Facility: CLINIC | Age: 53
End: 2023-04-17
Payer: COMMERCIAL

## 2023-04-17 VITALS
DIASTOLIC BLOOD PRESSURE: 77 MMHG | HEIGHT: 65 IN | BODY MASS INDEX: 33.32 KG/M2 | WEIGHT: 200 LBS | HEART RATE: 73 BPM | SYSTOLIC BLOOD PRESSURE: 137 MMHG

## 2023-04-17 DIAGNOSIS — D14.0 INVERTED PAPILLOMA OF MAXILLARY SINUS: Primary | ICD-10-CM

## 2023-04-17 PROCEDURE — 31231 NASAL ENDOSCOPY DX: CPT | Performed by: OTOLARYNGOLOGY

## 2023-04-17 PROCEDURE — 99213 OFFICE O/P EST LOW 20 MIN: CPT | Mod: 25 | Performed by: OTOLARYNGOLOGY

## 2023-04-17 ASSESSMENT — PAIN SCALES - GENERAL: PAINLEVEL: NO PAIN (0)

## 2023-04-17 NOTE — PATIENT INSTRUCTIONS
You were seen in the ENT Clinic today by Dr. Chapin. If you have any questions or concerns after your appointment, please contact us (see below)       2.   Please return to the ENT clinic in 3-4 months.           How to Contact Us:  Send a Audigence message to your provider. Our team will respond to you via Audigence. Occasionally, we will need to call you to get further information.  For urgent matters (Monday-Friday), call the ENT Clinic: [phone] and speak with a call center team member - they will route your call appropriately.   If you'd like to speak directly with a nurse, please find our contact information below. We do our best to check voicemail frequently throughout the day, and will work to call you back within 1-2 days. For urgent matters, please use the general clinic phone numbers listed above.        Ellie JIANG RN  ENT RN Care Coordinator  Direct: [phone]  Marilyn RAYMOND LPN  Direct: [phone]         [facility]  Department of Otolaryngology

## 2023-04-17 NOTE — LETTER
"4/17/2023       RE: Chio Bledsoe  [address] [facility] [id number]     Dear Colleague,    Thank you for referring your patient, Chio Bledsoe, to the [facility] EAR NOSE AND THROAT CLINIC [place] at [facility]. Please see a copy of my visit note below.      [facility]  Return Visit  Encounter date:   April 17, 2023    Chief Complaint:   Follow-up     ID:  Recurrent RT IP of maxillary sinus   S/p revision surgery as below    Interval History:   Chio Bledsoe is a 52 year old female who presents for follow up. The patient underwent 4 papilloma removal surgeries with Dr. Gabriel, and her latest revision surgery for tumor removal earlier this year (02/15/2021) with me. At our last visit (12/16/22) she had a stable exam with no new symptoms and had 5-fluorouracil placed.     Today she tells me that she is doing well and both of her children are graduating this year. She has not received gardasil vaccine.     Sino-Nasal Outcome Test (SNOT - 22)  DNT      [place] Operative [facility]  Procedure Date: 02/15/2021      PREOPERATIVE DIAGNOSIS:   1.  Recurrent right maxillary sinus inverted papilloma       POSTOPERATIVE DIAGNOSIS:   1.  Recurrent right maxillary sinus inverted papilloma      PROCEDURE PERFORMED:    1. Right endoscopic maxillary antrostomy with tissue removal, revision.   2. Computerized image guidance, extradural     ATTENDING SURGEON:  Naeem Chapin MD     Review of systems: A 14-point review of systems has been conducted and is negative for any notable symptoms, except as dictated in the history of present illness.     Physical Exam:  Vital signs: /77 (BP Location: Left arm, Patient Position: Sitting, Cuff Size: Adult Regular)   Pulse 73   Ht 1.651 m (5' 5\")   Wt 90.7 kg (200 lb)   BMI 33.28 kg/m     General Appearance: No acute distress, appropriate demeanor, conversant  Eyes: moist conjunctivae; " EOMI; pupils symmetric; visual acuity grossly intact; no proptosis  Head: normocephalic; overall symmetric appearance without deformity  Face: overall symmetric without deformity; HB I/VI  Nose: No external deformity; see endoscopy  Lungs: symmetric chest rise; no wheezing  CV: Good distal perfusion; normal hear rate  Extremities: No deformity  Neurologic Exam: Cranial nerves II-XII are grossly intact; no focal deficit       Procedure Note  Procedure performed: Rigid nasal endoscopy  Indication: To evaluate for sinonasal pathology not visualized on routine anterior rhinoscopy  Anesthesia: 4% topical lidocaine with 0.05 % oxymetazoline  Description of procedure: A 70 degree, 3 mm rigid endoscope was inserted into bilateral nasal cavities and the nasal valves, nasal cavity, middle meatus, sphenoethmoid recess, nasopharynx were evaluated for evidence of obstruction, edema, purulence, polyps and/or mass/lesion.     [place]edrick Endoscopic Scoring System  Endoscopic observation Right Left   Polyps in middle meatus (0 = absent, 1 = restricted to middle meatus, 2 = Beyond middle meatus) 0    Discharge (0 = absent, 1 = thin and clear, 2 = thick, purulent) 0    Edema (0 = absent, 1 = mild-moderate, 2 = moderate-severe) 0    Crusting (0 = absent, 1 = mild-moderate, 2 = moderate-severe) 0    Scarring (0= absent, 1 = mild-moderate, 2 = moderate-severe) 0    Total 0      Findings  RT: stable small papilloma on right anterior floor of maxillary sinus, questionable focus along anterior maxillary sinus wall     The patient tolerated the procedure well without complication.     Laboratory Review:  N/A     Imaging Review:  N/A     Pathology Review:  N/A     Assessment/Medical Decision Making:  Recurrent RT IP of the maxillary sinus  S/p revision surgery as above      Right sided endoscopy today - stable IP of the maxillary sinus    Given stability I did not apply 5-fluorouracil today and feel we can take a break on this as long as  she remains stable but will monitor closely. We discussed nature of her inverted papilloma and I recommend she obtain the gardasil vaccine.     I briefly discussed expectations for surgery should she have progression of the IP whether benign or malignant although not indicated at this time.     Plan:  Obtain gardasil vaccine   RTC in 3-4 months      Naeem Chapin MD    [facility]  Rhinology, Endoscopic Skull Base Surgery  [facility]  Department of Otolaryngology - Head & Neck Surgery    Scribe Disclosure:  I, Helen Joe, am serving as a scribe to document services personally performed by Naeem Chapin MD at this visit, based upon the provider's statements to me. All documentation has been reviewed by the aforementioned provider prior to being entered into the official medical record.     Additional portions of the patient's history have been reviewed below.   ~~~~~~~~~~~~~~~~~~~~~~~~~~~~~~~~~~~~~~~~~~~~~~~~~~~~~~~~~~~~~~~~~~~~~~~~~~~~~~~~~~~~~~~~~~~~~~~~~~~~~~~~~~~~~~~~~~~~~~~~~~~~~~~~~~~~~~~    Past Medical History:   Diagnosis Date    Anxiety     Gastroesophageal reflux disease 1/1/16    Nasal polyps     Polyp of nasal cavity     Sleep apnea 1/1/17        Past Surgical History:   Procedure Laterality Date    NASAL/SINUS POLYPECTOMY  1/1/10    multiple surgeries    OPTICAL TRACKING SYSTEM ENDOSCOPIC SINUS SURGERY Right 2/15/2021    Procedure: Right image guided endoscopic maxillary antrostomy with tissue removal;  Surgeon: Naeem Chapin MD;  Location: [facility] OR    SURGICAL HISTORY OF -       sinus surgery X 3    SURGICAL HISTORY OF -       wisdom teeth extraction    SURGICAL HISTORY OF -   1/10    breaset reduction    SURGICAL HISTORY OF -   12-18-10    Right endoscopic maxillary antrostomy with removal of contents with image guidance        Family History   Problem Relation Age of Onset    C.A.D. Mother     Hypertension Mother     Lipids Mother     Heart  Disease Mother     Dementia Mother     Stomach Problem Mother         Acid Reflux    Dementia Father     Stomach Problem Father         Acid Reflux    Mental Illness Paternal Grandmother     Diabetes No family hx of     Cerebrovascular Disease No family hx of     Breast Cancer No family hx of     Cancer - colorectal No family hx of         Social History     Socioeconomic History    Marital status:    Tobacco Use    Smoking status: Never    Smokeless tobacco: Never   Vaping Use    Vaping status: Never Used   Substance and Sexual Activity    Alcohol use: Yes     Comment: 1-2 drinks per month    Drug use: No    Sexual activity: Yes     Partners: Male     Birth control/protection: Pill           Again, thank you for allowing me to participate in the care of your patient.      Sincerely,    Naeem Chapin MD

## 2023-08-02 ENCOUNTER — OFFICE VISIT (OUTPATIENT)
Dept: OTOLARYNGOLOGY | Facility: CLINIC | Age: 53
End: 2023-08-02
Payer: COMMERCIAL

## 2023-08-02 VITALS — SYSTOLIC BLOOD PRESSURE: 121 MMHG | DIASTOLIC BLOOD PRESSURE: 73 MMHG | HEART RATE: 89 BPM | OXYGEN SATURATION: 98 %

## 2023-08-02 DIAGNOSIS — D14.0 INVERTED PAPILLOMA OF MAXILLARY SINUS: Primary | ICD-10-CM

## 2023-08-02 PROCEDURE — 31231 NASAL ENDOSCOPY DX: CPT | Performed by: OTOLARYNGOLOGY

## 2023-08-02 PROCEDURE — 99212 OFFICE O/P EST SF 10 MIN: CPT | Mod: 25 | Performed by: OTOLARYNGOLOGY

## 2023-08-02 RX ORDER — SPIRONOLACTONE 100 MG/1
TABLET, FILM COATED ORAL
COMMUNITY
Start: 2023-07-11

## 2023-08-02 ASSESSMENT — PAIN SCALES - GENERAL: PAINLEVEL: NO PAIN (0)

## 2023-08-02 NOTE — PATIENT INSTRUCTIONS
You were seen in the ENT Clinic today by Dr. Chapin. If you have any questions or concerns after your appointment, please contact us (see below)       2.   Please return to the ENT clinic with Dr. Rodney Chan in 3-4 months           How to Contact Us:  Send a [employer] message to your provider. Our team will respond to you via [employer]. Occasionally, we will need to call you to get further information.  For urgent matters (Monday-Friday), call the ENT Clinic: [phone] and speak with a call center team member - they will route your call appropriately.   If you'd like to speak directly with a nurse, please find our contact information below. We do our best to check voicemail frequently throughout the day, and will work to call you back within 1-2 days. For urgent matters, please use the general clinic phone numbers listed above.        Ellie JIANG RN  ENT RN Care Coordinator  Direct: [phone]  Marilyn RAYMOND LPN  Direct: [phone]         [facility]  Department of Otolaryngology

## 2023-08-02 NOTE — LETTER
8/2/2023       RE: Chio Bledsoe  [address] [facility] [address]     Dear Colleague,    Thank you for referring your patient, Chio Bledsoe, to the [facility] EAR NOSE AND THROAT CLINIC [place] at [facility]. Please see a copy of my visit note below.      [facility]  Return Visit  Encounter date:   August 2nd, 2023    Chief Complaint:   Follow-up     ID:  Recurrent RT IP of maxillary sinus   S/p revision surgery as below    Interval History:   Chio Bledsoe is a 52 year old female who presents for follow up. The patient underwent 4 papilloma removal surgeries with Dr. Gabriel, and her latest revision surgery for tumor removal  (02/15/2021) with me.     No new symptoms today    Sino-Nasal Outcome Test (SNOT - 22)  DNT      [place] Operative History  Procedure Date: 02/15/2021      PREOPERATIVE DIAGNOSIS:   1.  Recurrent right maxillary sinus inverted papilloma       POSTOPERATIVE DIAGNOSIS:   1.  Recurrent right maxillary sinus inverted papilloma      PROCEDURE PERFORMED:    1. Right endoscopic maxillary antrostomy with tissue removal, revision.   2. Computerized image guidance, extradural     ATTENDING SURGEON:  Naeem Chapin MD     Review of systems: A 14-point review of systems has been conducted and is negative for any notable symptoms, except as dictated in the history of present illness.     Physical Exam:  Vital signs: /73 (BP Location: Left arm, Patient Position: Sitting, Cuff Size: Adult Large)   Pulse 89   SpO2 98%    General Appearance: No acute distress, appropriate demeanor, conversant  Eyes: moist conjunctivae; EOMI; pupils symmetric; visual acuity grossly intact; no proptosis  Head: normocephalic; overall symmetric appearance without deformity  Face: overall symmetric without deformity; HB I/VI  Nose: No external deformity; see endoscopy  Lungs: symmetric chest rise; no wheezing  CV: Good  distal perfusion; normal hear rate  Extremities: No deformity  Neurologic Exam: Cranial nerves II-XII are grossly intact; no focal deficit       Procedure Note  Procedure performed: Rigid nasal endoscopy  Indication: To evaluate for sinonasal pathology not visualized on routine anterior rhinoscopy  Anesthesia: 4% topical lidocaine with 0.05 % oxymetazoline  Description of procedure: A 70 degree, 3 mm rigid endoscope was inserted into bilateral nasal cavities and the nasal valves, nasal cavity, middle meatus, sphenoethmoid recess, nasopharynx were evaluated for evidence of obstruction, edema, purulence, polyps and/or mass/lesion.     [place]-Dedrick Endoscopic Scoring System  Endoscopic observation Right Left   Polyps in middle meatus (0 = absent, 1 = restricted to middle meatus, 2 = Beyond middle meatus) 0    Discharge (0 = absent, 1 = thin and clear, 2 = thick, purulent) 0    Edema (0 = absent, 1 = mild-moderate, 2 = moderate-severe) 0    Crusting (0 = absent, 1 = mild-moderate, 2 = moderate-severe) 0    Scarring (0= absent, 1 = mild-moderate, 2 = moderate-severe) 0    Total 0      Findings  RT: stable small papilloma on right anterior floor of maxillary sinus, questionable focus along anterior maxillary sinus wall     The patient tolerated the procedure well without complication.     Laboratory Review:  N/A     Imaging Review:  N/A     Pathology Review:  N/A     Assessment/Medical Decision Making:  Recurrent RT IP of the maxillary sinus  S/p revision surgery as above      Right sided endoscopy today - stable IP of the maxillary sinus    Given stability I did not apply 5-fluorouracil today and feel we can take a break on this as long as she remains stable but will monitor closely. We discussed nature of her inverted papilloma and I recommend she obtain the gardasil vaccine.     I briefly discussed expectations for surgery should she have progression of the IP.  Do think she should undergo surgery at some point in time  but there is no urgency to this.  Since she has such a small foci of disease, I think this could be well controlled with surgery and she may have no additional recurrence after this.    Plan:  Rinse as needed  Follow-up with Rodney Chan in 4-6 months. Consider revision surgery    Naeem Chapin MD    [facility]  Rhinology, Endoscopic Skull Base Surgery  [facility]  Department of Otolaryngology - Head & Neck Surgery    The above documentation was completed with the aid of voice recognition dictation software, and as a result, unexpected dictation errors may occur. Please don't hesitate to contact me for any clarification needed regarding this note.       Additional portions of the patient's history have been reviewed below.   ~~~~~~~~~~~~~~~~~~~~~~~~~~~~~~~~~~~~~~~~~~~~~~~~~~~~~~~~~~~~~~~~~~~~~~~~~~~~~~~~~~~~~~~~~~~~~~~~~~~~~~~~~~~~~~~~~~~~~~~~~~~~~~~~~~~~~~~    Past Medical History:   Diagnosis Date    Anxiety     Gastroesophageal reflux disease 1/1/16    Nasal polyps     Polyp of nasal cavity     Sleep apnea 1/1/17        Past Surgical History:   Procedure Laterality Date    NASAL/SINUS POLYPECTOMY  1/1/10    multiple surgeries    OPTICAL TRACKING SYSTEM ENDOSCOPIC SINUS SURGERY Right 2/15/2021    Procedure: Right image guided endoscopic maxillary antrostomy with tissue removal;  Surgeon: Naeem Chapin MD;  Location: [facility] OR    SURGICAL HISTORY OF -       sinus surgery X 3    SURGICAL HISTORY OF -       wisdom teeth extraction    SURGICAL HISTORY OF -   1/10    breaset reduction    SURGICAL HISTORY OF -   12-18-10    Right endoscopic maxillary antrostomy with removal of contents with image guidance        Family History   Problem Relation Age of Onset    C.A.D. Mother     Hypertension Mother     Lipids Mother     Heart Disease Mother     Dementia Mother     Stomach Problem Mother         Acid Reflux    Dementia Father     Stomach Problem Father         Acid Reflux     Mental Illness Paternal Grandmother     Diabetes No family hx of     Cerebrovascular Disease No family hx of     Breast Cancer No family hx of     Cancer - colorectal No family hx of         Social History     Socioeconomic History    Marital status:    Tobacco Use    Smoking status: Never    Smokeless tobacco: Never   Vaping Use    Vaping Use: Never used   Substance and Sexual Activity    Alcohol use: Yes     Comment: 1-2 drinks per month    Drug use: No    Sexual activity: Yes     Partners: Male     Birth control/protection: Pill           Again, thank you for allowing me to participate in the care of your patient.      Sincerely,    Naeem Chapin MD

## 2023-08-16 NOTE — PROGRESS NOTES
[facility]  Return Visit  Encounter date:   August 2nd, 2023    Chief Complaint:   Follow-up     ID:  Recurrent RT IP of maxillary sinus   S/p revision surgery as below    Interval History:   Chio Bledsoe is a 52 year old female who presents for follow up. The patient underwent 4 papilloma removal surgeries with Dr. Gabriel, and her latest revision surgery for tumor removal  (02/15/2021) with me.     No new symptoms today    Sino-Nasal Outcome Test (SNOT - 22)  DNT      [place] Operative History  Procedure Date: 02/15/2021      PREOPERATIVE DIAGNOSIS:   1.  Recurrent right maxillary sinus inverted papilloma       POSTOPERATIVE DIAGNOSIS:   1.  Recurrent right maxillary sinus inverted papilloma      PROCEDURE PERFORMED:    1. Right endoscopic maxillary antrostomy with tissue removal, revision.   2. Computerized image guidance, extradural     ATTENDING SURGEON:  Naeem Chapin MD     Review of systems: A 14-point review of systems has been conducted and is negative for any notable symptoms, except as dictated in the history of present illness.     Physical Exam:  Vital signs: /73 (BP Location: Left arm, Patient Position: Sitting, Cuff Size: Adult Large)   Pulse 89   SpO2 98%    General Appearance: No acute distress, appropriate demeanor, conversant  Eyes: moist conjunctivae; EOMI; pupils symmetric; visual acuity grossly intact; no proptosis  Head: normocephalic; overall symmetric appearance without deformity  Face: overall symmetric without deformity; HB I/VI  Nose: No external deformity; see endoscopy  Lungs: symmetric chest rise; no wheezing  CV: Good distal perfusion; normal hear rate  Extremities: No deformity  Neurologic Exam: Cranial nerves II-XII are grossly intact; no focal deficit       Procedure Note  Procedure performed: Rigid nasal endoscopy  Indication: To evaluate for sinonasal pathology not visualized on routine anterior rhinoscopy  Anesthesia: 4% topical lidocaine with  0.05 % oxymetazoline  Description of procedure: A 70 degree, 3 mm rigid endoscope was inserted into bilateral nasal cavities and the nasal valves, nasal cavity, middle meatus, sphenoethmoid recess, nasopharynx were evaluated for evidence of obstruction, edema, purulence, polyps and/or mass/lesion.     [place]-Dedrick Endoscopic Scoring System  Endoscopic observation Right Left   Polyps in middle meatus (0 = absent, 1 = restricted to middle meatus, 2 = Beyond middle meatus) 0    Discharge (0 = absent, 1 = thin and clear, 2 = thick, purulent) 0    Edema (0 = absent, 1 = mild-moderate, 2 = moderate-severe) 0    Crusting (0 = absent, 1 = mild-moderate, 2 = moderate-severe) 0    Scarring (0= absent, 1 = mild-moderate, 2 = moderate-severe) 0    Total 0      Findings  RT: stable small papilloma on right anterior floor of maxillary sinus, questionable focus along anterior maxillary sinus wall     The patient tolerated the procedure well without complication.     Laboratory Review:  N/A     Imaging Review:  N/A     Pathology Review:  N/A     Assessment/Medical Decision Making:  Recurrent RT IP of the maxillary sinus  S/p revision surgery as above      Right sided endoscopy today - stable IP of the maxillary sinus    Given stability I did not apply 5-fluorouracil today and feel we can take a break on this as long as she remains stable but will monitor closely. We discussed nature of her inverted papilloma and I recommend she obtain the gardasil vaccine.     I briefly discussed expectations for surgery should she have progression of the IP.  Do think she should undergo surgery at some point in time but there is no urgency to this.  Since she has such a small foci of disease, I think this could be well controlled with surgery and she may have no additional recurrence after this.    Plan:  Rinse as needed  Follow-up with Rodney Chan in 4-6 months. Consider revision surgery    Naeem Chapin MD    [facility]  Rhinology, Endoscopic Skull Base Surgery  [facility]  Department of Otolaryngology - Head & Neck Surgery    The above documentation was completed with the aid of voice recognition dictation software, and as a result, unexpected dictation errors may occur. Please don't hesitate to contact me for any clarification needed regarding this note.       Additional portions of the patient's history have been reviewed below.   ~~~~~~~~~~~~~~~~~~~~~~~~~~~~~~~~~~~~~~~~~~~~~~~~~~~~~~~~~~~~~~~~~~~~~~~~~~~~~~~~~~~~~~~~~~~~~~~~~~~~~~~~~~~~~~~~~~~~~~~~~~~~~~~~~~~~~~~    Past Medical History:   Diagnosis Date    Anxiety     Gastroesophageal reflux disease 1/1/16    Nasal polyps     Polyp of nasal cavity     Sleep apnea 1/1/17        Past Surgical History:   Procedure Laterality Date    NASAL/SINUS POLYPECTOMY  1/1/10    multiple surgeries    OPTICAL TRACKING SYSTEM ENDOSCOPIC SINUS SURGERY Right 2/15/2021    Procedure: Right image guided endoscopic maxillary antrostomy with tissue removal;  Surgeon: Naeem Chapin MD;  Location: [facility] OR    SURGICAL HISTORY OF -       sinus surgery X 3    SURGICAL HISTORY OF -       wisdom teeth extraction    SURGICAL HISTORY OF -   1/10    breaset reduction    SURGICAL HISTORY OF -   12-18-10    Right endoscopic maxillary antrostomy with removal of contents with image guidance        Family History   Problem Relation Age of Onset    C.A.D. Mother     Hypertension Mother     Lipids Mother     Heart Disease Mother     Dementia Mother     Stomach Problem Mother         Acid Reflux    Dementia Father     Stomach Problem Father         Acid Reflux    Mental Illness Paternal Grandmother     Diabetes No family hx of     Cerebrovascular Disease No family hx of     Breast Cancer No family hx of     Cancer - colorectal No family hx of         Social History     Socioeconomic History    Marital status:    Tobacco Use    Smoking status: Never    Smokeless tobacco: Never    Vaping Use    Vaping Use: Never used   Substance and Sexual Activity    Alcohol use: Yes     Comment: 1-2 drinks per month    Drug use: No    Sexual activity: Yes     Partners: Male     Birth control/protection: Pill

## 2023-09-26 ENCOUNTER — TELEPHONE (OUTPATIENT)
Dept: OTOLARYNGOLOGY | Facility: CLINIC | Age: 53
End: 2023-09-26
Payer: COMMERCIAL

## 2023-09-26 NOTE — TELEPHONE ENCOUNTER
LVM to schedule a follow up with Dr. Chan. Previous patient of Dr. Chapin. Looking at Dec - Feb. Can use a return spot. Gave call center number

## 2023-10-25 ENCOUNTER — LAB REQUISITION (OUTPATIENT)
Dept: LAB | Facility: CLINIC | Age: 53
End: 2023-10-25

## 2023-10-25 DIAGNOSIS — Z13.220 ENCOUNTER FOR SCREENING FOR LIPOID DISORDERS: ICD-10-CM

## 2023-10-25 LAB
CHOLEST SERPL-MCNC: 207 MG/DL
HDLC SERPL-MCNC: 43 MG/DL
LDLC SERPL CALC-MCNC: 136 MG/DL
NONHDLC SERPL-MCNC: 164 MG/DL
TRIGL SERPL-MCNC: 138 MG/DL

## 2023-10-25 PROCEDURE — 80061 LIPID PANEL: CPT | Performed by: NURSE PRACTITIONER

## 2023-11-11 ENCOUNTER — HEALTH MAINTENANCE LETTER (OUTPATIENT)
Age: 53
End: 2023-11-11

## 2023-11-29 ENCOUNTER — OFFICE VISIT (OUTPATIENT)
Dept: OTOLARYNGOLOGY | Facility: CLINIC | Age: 53
End: 2023-11-29
Payer: COMMERCIAL

## 2023-11-29 VITALS
BODY MASS INDEX: 33.28 KG/M2 | HEART RATE: 87 BPM | HEIGHT: 65 IN | RESPIRATION RATE: 16 BRPM | SYSTOLIC BLOOD PRESSURE: 125 MMHG | DIASTOLIC BLOOD PRESSURE: 76 MMHG | OXYGEN SATURATION: 96 %

## 2023-11-29 DIAGNOSIS — D14.0 INVERTED PAPILLOMA OF MAXILLARY SINUS: Primary | ICD-10-CM

## 2023-11-29 PROCEDURE — 99213 OFFICE O/P EST LOW 20 MIN: CPT | Mod: 25 | Performed by: STUDENT IN AN ORGANIZED HEALTH CARE EDUCATION/TRAINING PROGRAM

## 2023-11-29 PROCEDURE — 31231 NASAL ENDOSCOPY DX: CPT | Performed by: STUDENT IN AN ORGANIZED HEALTH CARE EDUCATION/TRAINING PROGRAM

## 2023-11-29 ASSESSMENT — PAIN SCALES - GENERAL: PAINLEVEL: NO PAIN (0)

## 2023-11-29 NOTE — PROGRESS NOTES
"      Chief Complaint:   Follow-up     ID:  Recurrent RT IP of maxillary sinus   S/p revision surgery as below    Interval History:   Chio Bledsoe is a 52 year old female who presents for follow up. The patient underwent 4 papilloma removal surgeries with Dr. Gabriel, and her latest revision surgery for tumor removal  (02/15/2021) with me.     Interval Hx (11/29/23)    No new symptoms today, doing well. No major issues    Sino-Nasal Outcome Test (SNOT - 22)  [facility] Operative History  Procedure Date: 02/15/2021      PREOPERATIVE DIAGNOSIS:   1.  Recurrent right maxillary sinus inverted papilloma       POSTOPERATIVE DIAGNOSIS:   1.  Recurrent right maxillary sinus inverted papilloma      PROCEDURE PERFORMED:    1. Right endoscopic maxillary antrostomy with tissue removal, revision.   2. Computerized image guidance, extradural     ATTENDING SURGEON:  Naeem Chapin MD     Review of systems: A 14-point review of systems has been conducted and is negative for any notable symptoms, except as dictated in the history of present illness.     Physical Exam:  Vital signs: /76 (BP Location: Left arm, Patient Position: Sitting, Cuff Size: Adult Regular)   Pulse 87   Resp 16   Ht 1.651 m (5' 5\")   SpO2 96%   BMI 33.28 kg/m     General Appearance: No acute distress, appropriate demeanor, conversant  Eyes: moist conjunctivae; EOMI; pupils symmetric; visual acuity grossly intact; no proptosis  Head: normocephalic; overall symmetric appearance without deformity  Face: overall symmetric without deformity; HB I/VI  Nose: No external deformity; see endoscopy  Lungs: symmetric chest rise; no wheezing  CV: Good distal perfusion; normal hear rate  Extremities: No deformity  Neurologic Exam: Cranial nerves II-XII are grossly intact; no focal deficit       Procedure Note  Procedure performed: Rigid nasal endoscopy  Indication: To evaluate for sinonasal pathology not visualized on routine anterior " rhinoscopy  Anesthesia: 4% topical lidocaine with 0.05 % oxymetazoline  Description of procedure: A 70 degree, 3 mm rigid endoscope was inserted into bilateral nasal cavities and the nasal valves, nasal cavity, middle meatus, sphenoethmoid recess, nasopharynx were evaluated for evidence of obstruction, edema, purulence, polyps and/or mass/lesion.     Luckey-Dedrick Endoscopic Scoring System  Endoscopic observation Right Left   Polyps in middle meatus (0 = absent, 1 = restricted to middle meatus, 2 = Beyond middle meatus) 0    Discharge (0 = absent, 1 = thin and clear, 2 = thick, purulent) 1    Edema (0 = absent, 1 = mild-moderate, 2 = moderate-severe) 0    Crusting (0 = absent, 1 = mild-moderate, 2 = moderate-severe) 0    Scarring (0= absent, 1 = mild-moderate, 2 = moderate-severe) 0    Total 1      Findings  RT: stable papilloma on right anterior floor of maxillary sinus, questionable focus along anterior maxillary sinus wall     The patient tolerated the procedure well without complication.       Assessment/Medical Decision Making:  Recurrent RT IP of the maxillary sinus  S/p revision surgery as above      Right sided endoscopy today - IP of the maxillary sinus/nasal floor    Discussed that based on size and appearance we should take this out at some point.Very small focus and no evidence of disease in the anterior wall. Do think this would be a minor surgery.     Plan:  Rinse as needed  Will follow-up in 4-5 months and see if it's growing/continue discussion of future surgery.       Past Medical History:   Diagnosis Date    Anxiety     Gastroesophageal reflux disease 1/1/16    Nasal polyps     Polyp of nasal cavity     Sleep apnea 1/1/17        Past Surgical History:   Procedure Laterality Date    NASAL/SINUS POLYPECTOMY  1/1/10    multiple surgeries    OPTICAL TRACKING SYSTEM ENDOSCOPIC SINUS SURGERY Right 2/15/2021    Procedure: Right image guided endoscopic maxillary antrostomy with tissue removal;  Surgeon:  Naeem Chapin MD;  Location: [facility] OR    SURGICAL HISTORY OF -       sinus surgery X 3    SURGICAL HISTORY OF -       wisdom teeth extraction    SURGICAL HISTORY OF -   1/10    breaset reduction    SURGICAL HISTORY OF -   12-18-10    Right endoscopic maxillary antrostomy with removal of contents with image guidance        Family History   Problem Relation Age of Onset    C.A.D. Mother     Hypertension Mother     Lipids Mother     Heart Disease Mother     Dementia Mother     Stomach Problem Mother         Acid Reflux    Dementia Father     Stomach Problem Father         Acid Reflux    Mental Illness Paternal Grandmother     Diabetes No family hx of     Cerebrovascular Disease No family hx of     Breast Cancer No family hx of     Cancer - colorectal No family hx of         Social History     Socioeconomic History    Marital status:      Spouse name: None    Number of children: None    Years of education: None    Highest education level: None   Tobacco Use    Smoking status: Never    Smokeless tobacco: Never   Vaping Use    Vaping Use: Never used   Substance and Sexual Activity    Alcohol use: Yes     Comment: 1-2 drinks per month    Drug use: No    Sexual activity: Yes     Partners: Male     Birth control/protection: Pill

## 2023-11-29 NOTE — NURSING NOTE
"Chief Complaint   Patient presents with    Follow Up     Reestablish care       Vitals:    11/29/23 1039   BP: 125/76   BP Location: Left arm   Patient Position: Sitting   Cuff Size: Adult Regular   Pulse: 87   Resp: 16   SpO2: 96%   Height: 1.651 m (5' 5\")       Body mass index is 33.28 kg/m .                          Jesus Solo NRP  "

## 2023-11-29 NOTE — LETTER
"11/29/2023       RE: Chio Bledsoe  [address] [id number]     Dear Colleague,    Thank you for referring your patient, Chio Bledsoe, to the [facility] [place] [facility]. Please see a copy of my visit note below.      Chief Complaint:   Follow-up     ID:  Recurrent RT IP of maxillary sinus   S/p revision surgery as below    Interval History:   Chio Bledsoe is a 52 year old female who presents for follow up. The patient underwent 4 papilloma removal surgeries with Dr. Gabriel, and her latest revision surgery for tumor removal  (02/15/2021) with me.     Interval Hx (11/29/23)    No new symptoms today, doing well. No major issues    Sino-Nasal Outcome Test (SNOT - 22)  [facility] Operative History  Procedure Date: 02/15/2021      PREOPERATIVE DIAGNOSIS:   1.  Recurrent right maxillary sinus inverted papilloma       POSTOPERATIVE DIAGNOSIS:   1.  Recurrent right maxillary sinus inverted papilloma      PROCEDURE PERFORMED:    1. Right endoscopic maxillary antrostomy with tissue removal, revision.   2. Computerized image guidance, extradural     ATTENDING SURGEON:  Naeem Chapin MD     Review of systems: A 14-point review of systems has been conducted and is negative for any notable symptoms, except as dictated in the history of present illness.     Physical Exam:  Vital signs: /76 (BP Location: Left arm, Patient Position: Sitting, Cuff Size: Adult Regular)   Pulse 87   Resp 16   Ht 1.651 m (5' 5\")   SpO2 96%   BMI 33.28 kg/m     General Appearance: No acute distress, appropriate demeanor, conversant  Eyes: moist conjunctivae; EOMI; pupils symmetric; visual acuity grossly intact; no proptosis  Head: normocephalic; overall symmetric appearance without deformity  Face: overall symmetric without deformity; HB I/VI  Nose: No external deformity; see endoscopy  Lungs: symmetric " chest rise; no wheezing  CV: Good distal perfusion; normal hear rate  Extremities: No deformity  Neurologic Exam: Cranial nerves II-XII are grossly intact; no focal deficit       Procedure Note  Procedure performed: Rigid nasal endoscopy  Indication: To evaluate for sinonasal pathology not visualized on routine anterior rhinoscopy  Anesthesia: 4% topical lidocaine with 0.05 % oxymetazoline  Description of procedure: A 70 degree, 3 mm rigid endoscope was inserted into bilateral nasal cavities and the nasal valves, nasal cavity, middle meatus, sphenoethmoid recess, nasopharynx were evaluated for evidence of obstruction, edema, purulence, polyps and/or mass/lesion.     Mickleton-Dedrick Endoscopic Scoring System  Endoscopic observation Right Left   Polyps in middle meatus (0 = absent, 1 = restricted to middle meatus, 2 = Beyond middle meatus) 0    Discharge (0 = absent, 1 = thin and clear, 2 = thick, purulent) 1    Edema (0 = absent, 1 = mild-moderate, 2 = moderate-severe) 0    Crusting (0 = absent, 1 = mild-moderate, 2 = moderate-severe) 0    Scarring (0= absent, 1 = mild-moderate, 2 = moderate-severe) 0    Total 1      Findings  RT: stable papilloma on right anterior floor of maxillary sinus, questionable focus along anterior maxillary sinus wall     The patient tolerated the procedure well without complication.       Assessment/Medical Decision Making:  Recurrent RT IP of the maxillary sinus  S/p revision surgery as above      Right sided endoscopy today - IP of the maxillary sinus/nasal floor    Discussed that based on size and appearance we should take this out at some point.Very small focus and no evidence of disease in the anterior wall. Do think this would be a minor surgery.     Plan:  Rinse as needed  Will follow-up in 4-5 months and see if it's growing/continue discussion of future surgery.       Past Medical History:   Diagnosis Date    Anxiety     Gastroesophageal reflux disease 1/1/16    Nasal polyps     Polyp  of nasal cavity     Sleep apnea 1/1/17        Past Surgical History:   Procedure Laterality Date    NASAL/SINUS POLYPECTOMY  1/1/10    multiple surgeries    OPTICAL TRACKING SYSTEM ENDOSCOPIC SINUS SURGERY Right 2/15/2021    Procedure: Right image guided endoscopic maxillary antrostomy with tissue removal;  Surgeon: Naeem Chapin MD;  Location: [facility] OR    SURGICAL HISTORY OF -       sinus surgery X 3    SURGICAL HISTORY OF -       wisdom teeth extraction    SURGICAL HISTORY OF -   1/10    breaset reduction    SURGICAL HISTORY OF -   12-18-10    Right endoscopic maxillary antrostomy with removal of contents with image guidance        Family History   Problem Relation Age of Onset    C.A.D. Mother     Hypertension Mother     Lipids Mother     Heart Disease Mother     Dementia Mother     Stomach Problem Mother         Acid Reflux    Dementia Father     Stomach Problem Father         Acid Reflux    Mental Illness Paternal Grandmother     Diabetes No family hx of     Cerebrovascular Disease No family hx of     Breast Cancer No family hx of     Cancer - colorectal No family hx of         Social History     Socioeconomic History    Marital status:      Spouse name: None    Number of children: None    Years of education: None    Highest education level: None   Tobacco Use    Smoking status: Never    Smokeless tobacco: Never   Vaping Use    Vaping Use: Never used   Substance and Sexual Activity    Alcohol use: Yes     Comment: 1-2 drinks per month    Drug use: No    Sexual activity: Yes     Partners: Male     Birth control/protection: Pill           Again, thank you for allowing me to participate in the care of your patient.      Sincerely,    Rodney Chan MD

## 2023-11-29 NOTE — PATIENT INSTRUCTIONS
You were seen in the ENT Clinic today by Dr. Chan. If you have any questions or concerns after your appointment, please contact us (see below)       2.   Please return to the ENT clinic in 4-5 months.           How to Contact Us:  Send a [employer] message to your provider. Our team will respond to you via [employer]. Occasionally, we will need to call you to get further information.  For urgent matters (Monday-Friday), call the ENT Clinic: [phone] and speak with a call center team member - they will route your call appropriately.   If you'd like to speak directly with a nurse, please find our contact information below. We do our best to check voicemail frequently throughout the day, and will work to call you back within 1-2 days. For urgent matters, please use the general clinic phone numbers listed above.        Ellie JIANG RN  ENT RN Care Coordinator  Direct: [phone]  Marilyn RAYMOND LPN  Direct: [phone]         [facility]  Department of Otolaryngology

## 2024-03-27 ENCOUNTER — OFFICE VISIT (OUTPATIENT)
Dept: OTOLARYNGOLOGY | Facility: CLINIC | Age: 54
End: 2024-03-27
Payer: COMMERCIAL

## 2024-03-27 VITALS — OXYGEN SATURATION: 96 % | HEIGHT: 65 IN | BODY MASS INDEX: 33.28 KG/M2 | HEART RATE: 87 BPM

## 2024-03-27 DIAGNOSIS — D14.0 INVERTED PAPILLOMA OF MAXILLARY SINUS: Primary | ICD-10-CM

## 2024-03-27 PROCEDURE — 31231 NASAL ENDOSCOPY DX: CPT | Performed by: STUDENT IN AN ORGANIZED HEALTH CARE EDUCATION/TRAINING PROGRAM

## 2024-03-27 PROCEDURE — 99212 OFFICE O/P EST SF 10 MIN: CPT | Mod: 25 | Performed by: STUDENT IN AN ORGANIZED HEALTH CARE EDUCATION/TRAINING PROGRAM

## 2024-03-27 RX ORDER — BIOTIN 1000 MCG
TABLET,CHEWABLE ORAL
COMMUNITY

## 2024-03-27 ASSESSMENT — PAIN SCALES - GENERAL: PAINLEVEL: NO PAIN (0)

## 2024-03-27 NOTE — PATIENT INSTRUCTIONS
You were seen in the ENT Clinic today by Dr. Chan. If you have any questions or concerns after your appointment, please contact us (see below)       2.   Please return to the ENT clinic as needed. Please contact us when you are ready to schedule surgery.           How to Contact Us:  Send a [employer] message to your provider. Our team will respond to you via [employer]. Occasionally, we will need to call you to get further information.  For urgent matters (Monday-Friday), call the ENT Clinic: [phone] and speak with a call center team member - they will route your call appropriately.   If you'd like to speak directly with a nurse, please find our contact information below. We do our best to check voicemail frequently throughout the day, and will work to call you back within 1-2 days. For urgent matters, please use the general clinic phone numbers listed above.          Marilyn RAYMOND LPN  Direct: [phone]         [facility]  Department of Otolaryngology

## 2024-03-27 NOTE — NURSING NOTE
"Chief Complaint   Patient presents with    RECHECK   Pulse 87, height 1.651 m (5' 5\"), SpO2 96%, not currently breastfeeding. Danny Burkett, EMT    "

## 2024-03-27 NOTE — PROGRESS NOTES
"  [facility]  Return Visit  Encounter date:   March 27, 2024    Chief Complaint:   Follow-up    ID:  Recurrent RT IP of maxillary sinus   S/p revision surgery as below     Interval History(08/02/23):   Chio Bledsoe is a 52 year old female who presents for follow up. The patient underwent 4 papilloma removal surgeries with Dr. Gabriel, and her latest revision surgery for tumor removal  (02/15/2021) with me.      Interval Hx (11/29/23)     No new symptoms today, doing well. No major issues     Interval History(03/27/24):   Chio Bledsoe is a 53 year old female who presents for follow up. She reports she is doing well with no acute issues to report.     Sino-Nasal Outcome Test (SNOT - 22)    [facility] Operative History  Procedure Date: 02/15/2021      PREOPERATIVE DIAGNOSIS:   1.  Recurrent right maxillary sinus inverted papilloma       POSTOPERATIVE DIAGNOSIS:   1.  Recurrent right maxillary sinus inverted papilloma      PROCEDURE PERFORMED:    1. Right endoscopic maxillary antrostomy with tissue removal, revision.   2. Computerized image guidance, extradural     ATTENDING SURGEON:  Naeem Chapin MD    Review of systems: A 14-point review of systems has been conducted and is negative for any notable symptoms, except as dictated in the history of present illness.     Physical Exam:  Vital signs: Pulse 87   Ht 1.651 m (5' 5\")   SpO2 96%   BMI 33.28 kg/m     General Appearance: No acute distress, appropriate demeanor, conversant  Eyes: moist conjunctivae; EOMI; pupils symmetric; visual acuity grossly intact; no proptosis  Head: normocephalic; overall symmetric appearance without deformity  Face: overall symmetric without deformity  Ears: Normal appearance of external ear  Nose: No external deformity  Oral Cavity/oropharynx: Normal appearance of mucosa  Neck: no palpable lymphadenopathy; thyroid without palpable nodules  Lungs: symmetric chest rise; no wheezing  CV: Good distal " perfusion; normal hear rate  Extremities: No deformity  Neurologic Exam: Cranial nerves II-XII are grossly intact      Procedure Note  Procedure performed: Rigid nasal endoscopy  Indication: To evaluate for sinonasal pathology not visualized on routine anterior rhinoscopy  Anesthesia: 4% topical lidocaine with 0.05 % oxymetazoline  Description of procedure: A 30 degree, 3 mm rigid endoscope was inserted into bilateral nasal cavities and the nasal valves, nasal cavity, middle meatus, sphenoethmoid recess, nasopharynx were evaluated for evidence of obstruction, edema, purulence, polyps and/or mass/lesion.     Dari-Dedrick Endoscopic Scoring System  Endoscopic observation Right Left   Polyps in middle meatus (0 = absent, 1 = restricted to middle meatus, 2 = Beyond middle meatus) 0    Discharge (0 = absent, 1 = thin and clear, 2 = thick, purulent) 1    Edema (0 = absent, 1 = mild-moderate, 2 = moderate-severe) 0    Crusting (0 = absent, 1 = mild-moderate, 2 = moderate-severe) 0    Scarring (0= absent, 1 = mild-moderate, 2 = moderate-severe) 0    Total 1      Findings  RT: stable papilloma on right anterior floor of maxillary sinus, slightly bigger than before      The patient tolerated the procedure well without complication.       Assessment/Plan:  Chio Bledsoe is a 53 year old female who presents for follow up. Endoscopy findings include a stable small papilloma on the right maxillary sinus floor anteriorly.    We discussed at some point we will need to remove the papilloma. Patient endorsed this, and she will reach out to proceed with surgery later this year, when she has consulted with her insurance and met her deductible    Follow-up after surgery    Scribe Disclosure:   I, Ale Deluca, am serving as a scribe; to document services personally performed by Rodney Chan MD -based on data collection and the provider's statements to me.     Provider Disclosure:  I agree with above History, Review of Systems,  Physical exam and Plan.  I have reviewed the content of the documentation and have edited it as needed. I have personally performed the services documented here and the documentation accurately represents those services and the decisions I have made.      Electronically signed by:     Rodney Chan MD    [facility]  Rhinology, Endoscopic Skull Base Surgery  [facility]  Department of Otolaryngology - Head & Neck Surgery    ~~~~~~~~~~~~~~~~~~~~~~~~~~~~~~~~~~~~~~~~~~~~~~~~~~~~~~~~~~~~~~~~~~~~~~~~~~~~~~~~~~~~~~~~~~~~~~~~~~~~~~~~~~~~~~~~~~~~~~~~~~~~~~~~~~~~~~~    Past Medical History:   Diagnosis Date    Anxiety     Gastroesophageal reflux disease 1/1/16    Nasal polyps     Polyp of nasal cavity     Sleep apnea 1/1/17        Past Surgical History:   Procedure Laterality Date    NASAL/SINUS POLYPECTOMY  1/1/10    multiple surgeries    OPTICAL TRACKING SYSTEM ENDOSCOPIC SINUS SURGERY Right 2/15/2021    Procedure: Right image guided endoscopic maxillary antrostomy with tissue removal;  Surgeon: Naeem Chapin MD;  Location: [facility] OR    SURGICAL HISTORY OF -       sinus surgery X 3    SURGICAL HISTORY OF -       wisdom teeth extraction    SURGICAL HISTORY OF -   1/10    breaset reduction    SURGICAL HISTORY OF -   12-18-10    Right endoscopic maxillary antrostomy with removal of contents with image guidance        Family History   Problem Relation Age of Onset    C.A.D. Mother     Hypertension Mother     Lipids Mother     Heart Disease Mother     Dementia Mother     Stomach Problem Mother         Acid Reflux    Dementia Father     Stomach Problem Father         Acid Reflux    Mental Illness Paternal Grandmother     Diabetes No family hx of     Cerebrovascular Disease No family hx of     Breast Cancer No family hx of     Cancer - colorectal No family hx of         Social History     Socioeconomic History    Marital status:    Tobacco Use    Smoking status: Never     Smokeless tobacco: Never   Vaping Use    Vaping Use: Never used   Substance and Sexual Activity    Alcohol use: Yes     Comment: 1-2 drinks per month    Drug use: No    Sexual activity: Yes     Partners: Male     Birth control/protection: Pill        Unknown

## 2024-03-27 NOTE — LETTER
"3/27/2024       RE: Chio Bledsoe  [address] [facility] [id number]     Dear Colleague,    Thank you for referring your patient, Chio Bledsoe, to the [facility] [place] [facility]. Please see a copy of my visit note below.      [facility]  Return Visit  Encounter date:   March 27, 2024    Chief Complaint:   Follow-up    ID:  Recurrent RT IP of maxillary sinus   S/p revision surgery as below     Interval History(08/02/23):   Chio Bledsoe is a 52 year old female who presents for follow up. The patient underwent 4 papilloma removal surgeries with Dr. Gabriel, and her latest revision surgery for tumor removal  (02/15/2021) with me.      Interval Hx (11/29/23)     No new symptoms today, doing well. No major issues     Interval History(03/27/24):   Chio Bledsoe is a 53 year old female who presents for follow up. She reports she is doing well with no acute issues to report.     Sino-Nasal Outcome Test (SNOT - 22)    DNT       [place] [facility]  Procedure Date: 02/15/2021      PREOPERATIVE DIAGNOSIS:   1.  Recurrent right maxillary sinus inverted papilloma       POSTOPERATIVE DIAGNOSIS:   1.  Recurrent right maxillary sinus inverted papilloma      PROCEDURE PERFORMED:    1. Right endoscopic maxillary antrostomy with tissue removal, revision.   2. Computerized image guidance, extradural     ATTENDING SURGEON:  Naeem Chapin MD    Review of systems: A 14-point review of systems has been conducted and is negative for any notable symptoms, except as dictated in the history of present illness.     Physical Exam:  Vital signs: Pulse 87   Ht 1.651 m (5' 5\")   SpO2 96%   BMI 33.28 kg/m     General Appearance: No acute distress, appropriate demeanor, conversant  Eyes: moist conjunctivae; EOMI; pupils symmetric; visual acuity grossly intact; no proptosis  Head: normocephalic; overall " symmetric appearance without deformity  Face: overall symmetric without deformity  Ears: Normal appearance of external ear  Nose: No external deformity  Oral Cavity/oropharynx: Normal appearance of mucosa  Neck: no palpable lymphadenopathy; thyroid without palpable nodules  Lungs: symmetric chest rise; no wheezing  CV: Good distal perfusion; normal hear rate  Extremities: No deformity  Neurologic Exam: Cranial nerves II-XII are grossly intact      Procedure Note  Procedure performed: Rigid nasal endoscopy  Indication: To evaluate for sinonasal pathology not visualized on routine anterior rhinoscopy  Anesthesia: 4% topical lidocaine with 0.05 % oxymetazoline  Description of procedure: A 30 degree, 3 mm rigid endoscope was inserted into bilateral nasal cavities and the nasal valves, nasal cavity, middle meatus, sphenoethmoid recess, nasopharynx were evaluated for evidence of obstruction, edema, purulence, polyps and/or mass/lesion.     Dari-Dedrick Endoscopic Scoring System  Endoscopic observation Right Left   Polyps in middle meatus (0 = absent, 1 = restricted to middle meatus, 2 = Beyond middle meatus) 0    Discharge (0 = absent, 1 = thin and clear, 2 = thick, purulent) 1    Edema (0 = absent, 1 = mild-moderate, 2 = moderate-severe) 0    Crusting (0 = absent, 1 = mild-moderate, 2 = moderate-severe) 0    Scarring (0= absent, 1 = mild-moderate, 2 = moderate-severe) 0    Total 1      Findings  RT: stable papilloma on right anterior floor of maxillary sinus, slightly bigger than before      The patient tolerated the procedure well without complication.       Assessment/Plan:  Chiochai Bledsoe is a 53 year old female who presents for follow up. Endoscopy findings include a stable small papilloma on the right maxillary sinus floor anteriorly.    We discussed at some point we will need to remove the papilloma. Patient endorsed this, and she will reach out to proceed with surgery later this year, when she has consulted  with her insurance and met her deductible    Follow-up after surgery    Scribe Disclosure:   I, Ale Deluca, am serving as a scribe; to document services personally performed by Rodney Chan MD -based on data collection and the provider's statements to me.     Provider Disclosure:  I agree with above History, Review of Systems, Physical exam and Plan.  I have reviewed the content of the documentation and have edited it as needed. I have personally performed the services documented here and the documentation accurately represents those services and the decisions I have made.      Electronically signed by:     Rodney Chan MD    [facility]  Rhinology, Endoscopic Skull Base Surgery  [facility]  Department of Otolaryngology - Head & Neck Surgery    ~~~~~~~~~~~~~~~~~~~~~~~~~~~~~~~~~~~~~~~~~~~~~~~~~~~~~~~~~~~~~~~~~~~~~~~~~~~~~~~~~~~~~~~~~~~~~~~~~~~~~~~~~~~~~~~~~~~~~~~~~~~~~~~~~~~~~~~    Past Medical History:   Diagnosis Date    Anxiety     Gastroesophageal reflux disease 1/1/16    Nasal polyps     Polyp of nasal cavity     Sleep apnea 1/1/17        Past Surgical History:   Procedure Laterality Date    NASAL/SINUS POLYPECTOMY  1/1/10    multiple surgeries    OPTICAL TRACKING SYSTEM ENDOSCOPIC SINUS SURGERY Right 2/15/2021    Procedure: Right image guided endoscopic maxillary antrostomy with tissue removal;  Surgeon: Naeem Chapin MD;  Location: [facility] OR    SURGICAL HISTORY OF -       sinus surgery X 3    SURGICAL HISTORY OF -       wisdom teeth extraction    SURGICAL HISTORY OF -   1/10    breaset reduction    SURGICAL HISTORY OF -   12-18-10    Right endoscopic maxillary antrostomy with removal of contents with image guidance        Family History   Problem Relation Age of Onset    C.A.D. Mother     Hypertension Mother     Lipids Mother     Heart Disease Mother     Dementia Mother     Stomach Problem Mother         Acid Reflux    Dementia Father     Stomach Problem Father          Acid Reflux    Mental Illness Paternal Grandmother     Diabetes No family hx of     Cerebrovascular Disease No family hx of     Breast Cancer No family hx of     Cancer - colorectal No family hx of         Social History     Socioeconomic History    Marital status:    Tobacco Use    Smoking status: Never    Smokeless tobacco: Never   Vaping Use    Vaping Use: Never used   Substance and Sexual Activity    Alcohol use: Yes     Comment: 1-2 drinks per month    Drug use: No    Sexual activity: Yes     Partners: Male     Birth control/protection: Pill           Again, thank you for allowing me to participate in the care of your patient.      Sincerely,    Rodney Chan MD

## 2024-10-31 ENCOUNTER — LAB REQUISITION (OUTPATIENT)
Dept: LAB | Facility: CLINIC | Age: 54
End: 2024-10-31

## 2024-10-31 DIAGNOSIS — E78.2 MIXED HYPERLIPIDEMIA: ICD-10-CM

## 2024-10-31 DIAGNOSIS — L65.9 NONSCARRING HAIR LOSS, UNSPECIFIED: ICD-10-CM

## 2024-10-31 PROCEDURE — 84443 ASSAY THYROID STIM HORMONE: CPT | Performed by: NURSE PRACTITIONER

## 2024-10-31 PROCEDURE — 80061 LIPID PANEL: CPT | Performed by: NURSE PRACTITIONER

## 2024-11-01 LAB
CHOLEST SERPL-MCNC: 190 MG/DL
FASTING STATUS PATIENT QL REPORTED: ABNORMAL
HDLC SERPL-MCNC: 44 MG/DL
LDLC SERPL CALC-MCNC: 118 MG/DL
NONHDLC SERPL-MCNC: 146 MG/DL
TRIGL SERPL-MCNC: 138 MG/DL
TSH SERPL DL<=0.005 MIU/L-ACNC: 1.58 UIU/ML (ref 0.3–4.2)

## 2024-12-18 ENCOUNTER — LAB REQUISITION (OUTPATIENT)
Dept: LAB | Facility: CLINIC | Age: 54
End: 2024-12-18

## 2024-12-18 DIAGNOSIS — R42 DIZZINESS AND GIDDINESS: ICD-10-CM

## 2024-12-18 PROCEDURE — 84520 ASSAY OF UREA NITROGEN: CPT | Performed by: NURSE PRACTITIONER

## 2024-12-18 PROCEDURE — 80048 BASIC METABOLIC PNL TOTAL CA: CPT | Performed by: NURSE PRACTITIONER

## 2024-12-18 PROCEDURE — 84132 ASSAY OF SERUM POTASSIUM: CPT | Performed by: NURSE PRACTITIONER

## 2024-12-19 LAB
ANION GAP SERPL CALCULATED.3IONS-SCNC: 11 MMOL/L (ref 7–15)
BUN SERPL-MCNC: 16.3 MG/DL (ref 6–20)
CALCIUM SERPL-MCNC: 9.5 MG/DL (ref 8.8–10.4)
CHLORIDE SERPL-SCNC: 103 MMOL/L (ref 98–107)
CREAT SERPL-MCNC: 0.86 MG/DL (ref 0.51–0.95)
EGFRCR SERPLBLD CKD-EPI 2021: 79 ML/MIN/1.73M2
GLUCOSE SERPL-MCNC: 79 MG/DL (ref 70–99)
HCO3 SERPL-SCNC: 24 MMOL/L (ref 22–29)
POTASSIUM SERPL-SCNC: 4.4 MMOL/L (ref 3.4–5.3)
SODIUM SERPL-SCNC: 138 MMOL/L (ref 135–145)

## 2024-12-28 ENCOUNTER — HEALTH MAINTENANCE LETTER (OUTPATIENT)
Age: 54
End: 2024-12-28

## (undated) DEVICE — TUBING IRRIGATOR STRAIGHTSHOT XPS 1895522

## (undated) DEVICE — SOL NACL 0.9% INJ 1000ML BAG 2B1324X

## (undated) DEVICE — ESU GROUND PAD ADULT W/CORD E7507

## (undated) DEVICE — TRACKER ENT OTS INSTRUMENT FUSION 9733533

## (undated) DEVICE — BLADE SHAVER SINUS 4MM RAD 12DEG CVD 1884012HR

## (undated) DEVICE — BLADE SHAVER SINUS 3.5MM Q RAD 90DEG CVD 1883519HR

## (undated) DEVICE — ENDO SHEATH STORZ SHARPSITE ENDOSCRUB 0DEG 4MM 1912000

## (undated) DEVICE — TRACKER PATIENT NON-INVASIVE AXIEM 9734887XOM

## (undated) DEVICE — SPECIMEN TRAP STRYKER NEPTUNE IN-LINE 0700-050-000

## (undated) DEVICE — TUBING SUCTION 10'X3/16" N510

## (undated) DEVICE — LINEN TOWEL PACK X5 5464

## (undated) DEVICE — SPONGE COTTONOID 2X1/2" 80-1406

## (undated) DEVICE — WIPE INSTRUMENT MEROCEL 400200

## (undated) DEVICE — SOL NACL 0.9% IRRIG 1000ML BOTTLE 2F7124

## (undated) DEVICE — NDL 25GA 2"  8881200441

## (undated) DEVICE — EYE STRIP FLUORESCEIN TEST 1MG BIO-GLO HUO900-11

## (undated) DEVICE — DRSG HOLDER NASAL DALE 600

## (undated) DEVICE — SUCTION MANIFOLD NEPTUNE 2 SYS 1 PORT 702-025-000

## (undated) DEVICE — DRAPE WARMER 66X44" ORS-300

## (undated) DEVICE — BLADE SHAVER SINUS 4.0MM RAD 40 ROTATE  1884006HR

## (undated) DEVICE — PACK ENT ENDOSCOPY CUSTOM ASC

## (undated) DEVICE — SYR 30ML LL W/O NDL 302832

## (undated) DEVICE — SYR 03ML LL W/O NDL 309657

## (undated) DEVICE — GLOVE PROTEXIS MICRO 7.5  2D73PM75

## (undated) DEVICE — LIGHT HANDLE X1 31140133

## (undated) RX ORDER — OXYCODONE HYDROCHLORIDE 5 MG/1
TABLET ORAL
Status: DISPENSED
Start: 2021-02-15

## (undated) RX ORDER — GABAPENTIN 300 MG/1
CAPSULE ORAL
Status: DISPENSED
Start: 2021-02-15

## (undated) RX ORDER — HYDROMORPHONE HYDROCHLORIDE 1 MG/ML
INJECTION, SOLUTION INTRAMUSCULAR; INTRAVENOUS; SUBCUTANEOUS
Status: DISPENSED
Start: 2021-02-15

## (undated) RX ORDER — FENTANYL CITRATE 50 UG/ML
INJECTION, SOLUTION INTRAMUSCULAR; INTRAVENOUS
Status: DISPENSED
Start: 2021-02-15

## (undated) RX ORDER — LABETALOL HYDROCHLORIDE 5 MG/ML
INJECTION, SOLUTION INTRAVENOUS
Status: DISPENSED
Start: 2021-02-15

## (undated) RX ORDER — ONDANSETRON 2 MG/ML
INJECTION INTRAMUSCULAR; INTRAVENOUS
Status: DISPENSED
Start: 2021-02-15

## (undated) RX ORDER — PROPOFOL 10 MG/ML
INJECTION, EMULSION INTRAVENOUS
Status: DISPENSED
Start: 2021-02-15

## (undated) RX ORDER — LIDOCAINE HYDROCHLORIDE 20 MG/ML
INJECTION, SOLUTION EPIDURAL; INFILTRATION; INTRACAUDAL; PERINEURAL
Status: DISPENSED
Start: 2021-02-15

## (undated) RX ORDER — ACETAMINOPHEN 325 MG/1
TABLET ORAL
Status: DISPENSED
Start: 2021-02-15

## (undated) RX ORDER — CEFAZOLIN SODIUM 2 G/50ML
SOLUTION INTRAVENOUS
Status: DISPENSED
Start: 2021-02-15

## (undated) RX ORDER — DEXAMETHASONE SODIUM PHOSPHATE 10 MG/ML
INJECTION, SOLUTION INTRAMUSCULAR; INTRAVENOUS
Status: DISPENSED
Start: 2021-02-15